# Patient Record
Sex: FEMALE | Race: WHITE | NOT HISPANIC OR LATINO | Employment: UNEMPLOYED | URBAN - METROPOLITAN AREA
[De-identification: names, ages, dates, MRNs, and addresses within clinical notes are randomized per-mention and may not be internally consistent; named-entity substitution may affect disease eponyms.]

---

## 2017-01-30 DIAGNOSIS — M19.171 POST-TRAUMATIC OSTEOARTHRITIS OF RIGHT ANKLE: ICD-10-CM

## 2017-01-30 DIAGNOSIS — M25.571 CHRONIC PAIN OF RIGHT ANKLE: Primary | ICD-10-CM

## 2017-01-30 DIAGNOSIS — G89.29 CHRONIC PAIN OF RIGHT ANKLE: Primary | ICD-10-CM

## 2017-01-30 NOTE — PROGRESS NOTES
Pt calling for repeat injection which she gets every three months: Fluoroscopically guided synvisc injection of the right ankle joint and subtalar joints per Dr. Nj.  Order entered as requested and pt has radiology scheduling number to set it up.

## 2017-04-27 ENCOUNTER — TELEPHONE (OUTPATIENT)
Dept: ORTHOPEDICS | Facility: CLINIC | Age: 57
End: 2017-04-27

## 2017-04-27 DIAGNOSIS — M19.171 POST-TRAUMATIC OSTEOARTHRITIS OF RIGHT ANKLE: ICD-10-CM

## 2017-04-27 DIAGNOSIS — M25.571 CHRONIC PAIN OF RIGHT ANKLE: Primary | ICD-10-CM

## 2017-04-27 DIAGNOSIS — G89.29 CHRONIC PAIN OF RIGHT ANKLE: Primary | ICD-10-CM

## 2017-04-27 NOTE — TELEPHONE ENCOUNTER
Patient calling for repeat injection which she gets every three months. Last injection 2/7/17, Fluroscopically guided Synvisc injection of the right ankle joint and subtalar joints was entered per Dr. Nj. Patient notified.

## 2017-04-27 NOTE — TELEPHONE ENCOUNTER
----- Message from Kacie Lynch sent at 4/27/2017  1:35 PM CDT -----  Regarding: Pt need order for injections - Dr. Nj  Contact: [phone]  Pt of Dr. Nj called and is looking for injection order. Pt said order needs to be put in before 05/19/2017 by the afternoon for her rt ankle. Pt can be reached at [phone].    Thank you,  Kacie ANTOHNY

## 2017-07-12 ENCOUNTER — TELEPHONE (OUTPATIENT)
Dept: ORTHOPEDICS | Facility: CLINIC | Age: 57
End: 2017-07-12

## 2017-07-12 DIAGNOSIS — G89.29 CHRONIC PAIN OF RIGHT ANKLE: Primary | ICD-10-CM

## 2017-07-12 DIAGNOSIS — M25.571 CHRONIC PAIN OF RIGHT ANKLE: Primary | ICD-10-CM

## 2017-07-12 DIAGNOSIS — M19.171 POST-TRAUMATIC ARTHRITIS OF RIGHT ANKLE: ICD-10-CM

## 2017-07-12 NOTE — TELEPHONE ENCOUNTER
Returned patients phone call in regards to her request for an order for Synvisc injections to be placed.  Received message that her last injection was done 5/19/17 and that she would like to get her injection 8/18.  Message left for her to call back.      7/12/17 12:30pm- Returned patients call back. She was notified that we will place the order for her injection so she can schedule it for 8/18 but that this writer will discuss with Dr. Nj if she needs to return to clinic prior to that appointment since it has been 3 years since she was seen in clinic.  Patient was told that she will get a return phone call next Tuesday with further instructions as to whether or not she needs to return for a clinic appointment.

## 2017-10-16 ENCOUNTER — TELEPHONE (OUTPATIENT)
Dept: ORTHOPEDICS | Facility: CLINIC | Age: 57
End: 2017-10-16

## 2017-10-16 DIAGNOSIS — M25.571 PAIN IN JOINT INVOLVING ANKLE AND FOOT, RIGHT: Primary | ICD-10-CM

## 2017-10-16 NOTE — TELEPHONE ENCOUNTER
Aby has been receiving Right ankle and subtalar Synvisc injections for WC injury every three months, last injection 8/18/17.  She left message with our call center requesting orders for her next injection with hold date 11/10/17.  Order was entered per Dr Nj's plan.

## 2018-01-09 ENCOUNTER — TELEPHONE (OUTPATIENT)
Dept: ORTHOPEDICS | Facility: CLINIC | Age: 58
End: 2018-01-09

## 2018-01-09 DIAGNOSIS — M19.071 ARTHRITIS OF RIGHT ANKLE: Primary | ICD-10-CM

## 2018-02-23 ENCOUNTER — RADIANT APPOINTMENT (OUTPATIENT)
Dept: GENERAL RADIOLOGY | Facility: CLINIC | Age: 58
End: 2018-02-23
Attending: ORTHOPAEDIC SURGERY
Payer: COMMERCIAL

## 2018-02-23 DIAGNOSIS — M19.071 ARTHRITIS OF RIGHT ANKLE: ICD-10-CM

## 2018-02-23 RX ORDER — LIDOCAINE HYDROCHLORIDE 10 MG/ML
30 INJECTION, SOLUTION EPIDURAL; INFILTRATION; INTRACAUDAL; PERINEURAL ONCE
Status: COMPLETED | OUTPATIENT
Start: 2018-02-23 | End: 2018-02-23

## 2018-02-23 RX ORDER — TRIAMCINOLONE ACETONIDE 40 MG/ML
40 INJECTION, SUSPENSION INTRA-ARTICULAR; INTRAMUSCULAR ONCE
Status: DISCONTINUED | OUTPATIENT
Start: 2018-02-23 | End: 2018-02-23 | Stop reason: CLARIF

## 2018-02-23 RX ORDER — IOPAMIDOL 408 MG/ML
20 INJECTION, SOLUTION INTRATHECAL ONCE
Status: COMPLETED | OUTPATIENT
Start: 2018-02-23 | End: 2018-02-23

## 2018-02-23 RX ORDER — BUPIVACAINE HYDROCHLORIDE 2.5 MG/ML
25 INJECTION, SOLUTION EPIDURAL; INFILTRATION; INTRACAUDAL ONCE
Status: ACTIVE | OUTPATIENT
Start: 2018-02-23

## 2018-02-23 RX ADMIN — LIDOCAINE HYDROCHLORIDE 4 ML: 10 INJECTION, SOLUTION EPIDURAL; INFILTRATION; INTRACAUDAL; PERINEURAL at 09:08

## 2018-02-23 RX ADMIN — IOPAMIDOL 2 ML: 408 INJECTION, SOLUTION INTRATHECAL at 09:08

## 2018-04-17 ENCOUNTER — TELEPHONE (OUTPATIENT)
Dept: ORTHOPEDICS | Facility: CLINIC | Age: 58
End: 2018-04-17

## 2018-04-17 DIAGNOSIS — M19.071 ARTHRITIS OF RIGHT ANKLE: Primary | ICD-10-CM

## 2018-04-17 NOTE — TELEPHONE ENCOUNTER
Patient called requesting a repeat synvisc injection to her right ankle and right subtalar joint, she gets therse injections every 3 months per Dr. Nj.  Date of last injection was 2/23/18. Orders entered for patient to call and schedule the injection for May 18th or after.

## 2018-05-18 ENCOUNTER — RADIANT APPOINTMENT (OUTPATIENT)
Dept: GENERAL RADIOLOGY | Facility: CLINIC | Age: 58
End: 2018-05-18
Attending: ORTHOPAEDIC SURGERY
Payer: OTHER MISCELLANEOUS

## 2018-05-18 DIAGNOSIS — M19.071 ARTHRITIS OF RIGHT ANKLE: ICD-10-CM

## 2018-05-18 RX ORDER — IOPAMIDOL 408 MG/ML
20 INJECTION, SOLUTION INTRATHECAL ONCE
Status: COMPLETED | OUTPATIENT
Start: 2018-05-18 | End: 2018-05-18

## 2018-05-18 RX ORDER — LIDOCAINE HYDROCHLORIDE 10 MG/ML
5 INJECTION, SOLUTION EPIDURAL; INFILTRATION; INTRACAUDAL; PERINEURAL ONCE
Status: COMPLETED | OUTPATIENT
Start: 2018-05-18 | End: 2018-05-18

## 2018-05-18 RX ADMIN — LIDOCAINE HYDROCHLORIDE 5 ML: 10 INJECTION, SOLUTION EPIDURAL; INFILTRATION; INTRACAUDAL; PERINEURAL at 09:01

## 2018-05-18 RX ADMIN — IOPAMIDOL 2 ML: 408 INJECTION, SOLUTION INTRATHECAL at 09:01

## 2018-08-02 ENCOUNTER — TELEPHONE (OUTPATIENT)
Dept: ORTHOPEDICS | Facility: CLINIC | Age: 58
End: 2018-08-02

## 2018-08-02 DIAGNOSIS — M19.071 ARTHRITIS OF RIGHT ANKLE: Primary | ICD-10-CM

## 2018-08-02 NOTE — TELEPHONE ENCOUNTER
[facility] Call Center    Phone Message    May a detailed message be left on voicemail: yes    Reason for Call: Other: Pt needs order for joint injection     Action Taken: Message routed to:  [facility]

## 2018-08-23 ENCOUNTER — RADIANT APPOINTMENT (OUTPATIENT)
Dept: GENERAL RADIOLOGY | Facility: CLINIC | Age: 58
End: 2018-08-23
Attending: ORTHOPAEDIC SURGERY
Payer: OTHER MISCELLANEOUS

## 2018-08-23 DIAGNOSIS — M19.071 ARTHRITIS OF RIGHT ANKLE: ICD-10-CM

## 2018-08-23 RX ORDER — LIDOCAINE HYDROCHLORIDE 10 MG/ML
30 INJECTION, SOLUTION EPIDURAL; INFILTRATION; INTRACAUDAL; PERINEURAL ONCE
Status: COMPLETED | OUTPATIENT
Start: 2018-08-23 | End: 2018-08-23

## 2018-08-23 RX ORDER — IOPAMIDOL 408 MG/ML
20 INJECTION, SOLUTION INTRATHECAL ONCE
Status: COMPLETED | OUTPATIENT
Start: 2018-08-23 | End: 2018-08-23

## 2018-08-23 RX ADMIN — LIDOCAINE HYDROCHLORIDE 5 ML: 10 INJECTION, SOLUTION EPIDURAL; INFILTRATION; INTRACAUDAL; PERINEURAL at 10:57

## 2018-08-23 RX ADMIN — IOPAMIDOL 4 ML: 408 INJECTION, SOLUTION INTRATHECAL at 10:57

## 2018-10-08 ENCOUNTER — TELEPHONE (OUTPATIENT)
Dept: ORTHOPEDICS | Facility: CLINIC | Age: 58
End: 2018-10-08

## 2018-10-08 NOTE — TELEPHONE ENCOUNTER
[facility] Call Center    Phone Message    May a detailed message be left on voicemail: yes    Reason for Call: Order(s): Other:   Reason for requested: Injection  Date needed: Mid- November, requested the 16th  Provider name: Dr. Clem Genao Pt would like call back from [facility] on this      Action Taken: Message routed to:  [facility]o

## 2018-10-09 DIAGNOSIS — M25.571 PAIN IN JOINT, ANKLE AND FOOT, RIGHT: Primary | ICD-10-CM

## 2018-10-09 NOTE — TELEPHONE ENCOUNTER
RN called and left a voice  Message that the orders where put in for the fluro guided right tibiotalar injection and the Right subtalar injection.  Please call and let us know where you would like this done.

## 2018-11-07 ENCOUNTER — RADIANT APPOINTMENT (OUTPATIENT)
Dept: GENERAL RADIOLOGY | Facility: CLINIC | Age: 58
End: 2018-11-07
Attending: ORTHOPAEDIC SURGERY
Payer: OTHER MISCELLANEOUS

## 2018-11-07 DIAGNOSIS — M25.571 PAIN IN JOINT, ANKLE AND FOOT, RIGHT: ICD-10-CM

## 2018-11-07 RX ORDER — TRIAMCINOLONE ACETONIDE 40 MG/ML
40 INJECTION, SUSPENSION INTRA-ARTICULAR; INTRAMUSCULAR ONCE
Status: DISCONTINUED | OUTPATIENT
Start: 2018-11-07 | End: 2018-11-07 | Stop reason: CLARIF

## 2018-11-07 RX ORDER — LIDOCAINE HYDROCHLORIDE 10 MG/ML
30 INJECTION, SOLUTION EPIDURAL; INFILTRATION; INTRACAUDAL; PERINEURAL ONCE
Status: COMPLETED | OUTPATIENT
Start: 2018-11-07 | End: 2018-11-07

## 2018-11-07 RX ORDER — BUPIVACAINE HYDROCHLORIDE 2.5 MG/ML
25 INJECTION, SOLUTION EPIDURAL; INFILTRATION; INTRACAUDAL ONCE
Status: DISCONTINUED | OUTPATIENT
Start: 2018-11-07 | End: 2018-11-07 | Stop reason: CLARIF

## 2018-11-07 RX ORDER — IOPAMIDOL 408 MG/ML
20 INJECTION, SOLUTION INTRATHECAL ONCE
Status: COMPLETED | OUTPATIENT
Start: 2018-11-07 | End: 2018-11-07

## 2018-11-07 RX ADMIN — IOPAMIDOL 2 ML: 408 INJECTION, SOLUTION INTRATHECAL at 14:13

## 2018-11-07 RX ADMIN — LIDOCAINE HYDROCHLORIDE 5 ML: 10 INJECTION, SOLUTION EPIDURAL; INFILTRATION; INTRACAUDAL; PERINEURAL at 14:13

## 2018-11-20 ENCOUNTER — TELEPHONE (OUTPATIENT)
Dept: ONCOLOGY | Facility: CLINIC | Age: 58
End: 2018-11-20

## 2018-11-20 NOTE — TELEPHONE ENCOUNTER
Tobacco Treatment Team at the [facility] attempted to reach Ms. Medina on 11/20/2018 regarding the tobacco cessation program to help Ms. Medina to quit smoking. We will attempt to reach Ms. Medina another time.

## 2018-12-05 NOTE — TELEPHONE ENCOUNTER
Tobacco Treatment Team at the [facility] attempted to reach Ms. Medina on 12/5/2018 regarding the tobacco cessation program to help Ms. Medina to quit smoking. We will attempt to reach Ms. Medina another time.

## 2019-01-07 ENCOUNTER — TELEPHONE (OUTPATIENT)
Dept: ORTHOPEDICS | Facility: CLINIC | Age: 59
End: 2019-01-07

## 2019-01-07 NOTE — TELEPHONE ENCOUNTER
[facility] Call Center    Phone Message    May a detailed message be left on voicemail: yes    Reason for Call: Order(s): Other:   Reason for requested: Pt requesting order for inj.   Date needed: Please call pt once order have been placed.   Provider name: Dr. Nj      Action Taken: Message routed to:  [facility] Orthopedics

## 2019-01-09 NOTE — TELEPHONE ENCOUNTER
[facility] Call Center    Phone Message    May a detailed message be left on voicemail: yes    Reason for Call: Other: Pt is waiting for orders for injection to be put in system, pt tried to call and schedule and was unable to because there are no orders, please put orders in and then call pt to let her know they are in so she can schedule     Action Taken: Message routed to:  [facility] Clinic

## 2019-01-10 ENCOUNTER — TELEPHONE (OUTPATIENT)
Dept: ORTHOPEDICS | Facility: CLINIC | Age: 59
End: 2019-01-10

## 2019-01-10 DIAGNOSIS — M25.571 PAIN IN JOINT, ANKLE AND FOOT, RIGHT: Primary | ICD-10-CM

## 2019-01-10 NOTE — TELEPHONE ENCOUNTER
[facility] Call Center    Phone Message    May a detailed message be left on voicemail: yes    Reason for Call: Other: Pt calling for third time for orders for injection. Please place orders so she can schedule     Action Taken: Message routed to:  [facility] Orthopedics

## 2019-01-10 NOTE — TELEPHONE ENCOUNTER
The patient called in to get a repeat order for ankle and foot synvisc injections for her right side. I called the patient back and left a message telling the patient that I have filled the orders but she cannot get the injection until 2/7/19 or later as she is not allowed to get injection and closer than 3 months apart and her last injection was 11/7/18. I explained that if the patient is going to get her injection within the [facility] system they should be able to see the orders. If she chooses to get the injection outside of [place] she will need to call and provide a fax number for the location she is planning to get the injections so that we can send the order.    Tiffanie Cantrell, ATC

## 2019-01-10 NOTE — TELEPHONE ENCOUNTER
Orders in, voice message left for patient.   See below:  Tiffanie Cantrell, JOHNY          1/10/19 2:17 PM   Note      The patient called in to get a repeat order for ankle and foot synvisc injections for her right side. I called the patient back and left a message telling the patient that I have filled the orders but she cannot get the injection until 2/7/19 or later as she is not allowed to get injection and closer than 3 months apart and her last injection was 11/7/18. I explained that if the patient is going to get her injection within the [facility] system they should be able to see the orders. If she chooses to get the injection outside of [place] she will need to call and provide a fax number for the location she is planning to get the injections so that we can send the order.     Tiffanie Cantrell, ATC

## 2019-02-07 ENCOUNTER — ANCILLARY PROCEDURE (OUTPATIENT)
Dept: GENERAL RADIOLOGY | Facility: CLINIC | Age: 59
End: 2019-02-07
Payer: OTHER MISCELLANEOUS

## 2019-02-07 DIAGNOSIS — M25.571 PAIN IN JOINT, ANKLE AND FOOT, RIGHT: ICD-10-CM

## 2019-02-07 RX ORDER — BUPIVACAINE HYDROCHLORIDE 2.5 MG/ML
10 INJECTION, SOLUTION EPIDURAL; INFILTRATION; INTRACAUDAL ONCE
Status: ACTIVE | OUTPATIENT
Start: 2019-02-07

## 2019-02-07 RX ORDER — IOPAMIDOL 408 MG/ML
20 INJECTION, SOLUTION INTRATHECAL ONCE
Status: COMPLETED | OUTPATIENT
Start: 2019-02-07 | End: 2019-02-07

## 2019-02-07 RX ORDER — LIDOCAINE HYDROCHLORIDE 10 MG/ML
30 INJECTION, SOLUTION EPIDURAL; INFILTRATION; INTRACAUDAL; PERINEURAL ONCE
Status: COMPLETED | OUTPATIENT
Start: 2019-02-07 | End: 2019-02-07

## 2019-02-07 RX ADMIN — LIDOCAINE HYDROCHLORIDE 5 ML: 10 INJECTION, SOLUTION EPIDURAL; INFILTRATION; INTRACAUDAL; PERINEURAL at 10:17

## 2019-02-07 RX ADMIN — IOPAMIDOL 10 ML: 408 INJECTION, SOLUTION INTRATHECAL at 10:17

## 2019-04-05 ENCOUNTER — TELEPHONE (OUTPATIENT)
Dept: ORTHOPEDICS | Facility: CLINIC | Age: 59
End: 2019-04-05

## 2019-04-05 DIAGNOSIS — M25.579 PAIN IN JOINT INVOLVING ANKLE AND FOOT: Primary | ICD-10-CM

## 2019-04-05 NOTE — TELEPHONE ENCOUNTER
Called patient and let her know that I would put the order in for her, and that the earliest she can schedule the injection for is 5/7/19. She understood and will schedule once I put the order in.

## 2019-04-05 NOTE — TELEPHONE ENCOUNTER
[facility] Call Center    Phone Message    May a detailed message be left on voicemail: yes    Reason for Call: Other: Pt of Dr. Nj asking for a call back today about getting another order faxed.  Pt requested to speak with Dr. Nj's care team     Action Taken: Message routed to:  [facility] Ortho

## 2019-05-02 NOTE — TELEPHONE ENCOUNTER
"Pt states not being ready to quit at this time. \"I enjoy my vices as I deal with my health right now\" but would consider it in the future.     Dejah Vides [facility]  Tobacco Treatment Specialist  PH: [phone]    "

## 2019-05-07 ENCOUNTER — ANCILLARY PROCEDURE (OUTPATIENT)
Dept: GENERAL RADIOLOGY | Facility: CLINIC | Age: 59
End: 2019-05-07
Attending: ORTHOPAEDIC SURGERY
Payer: OTHER MISCELLANEOUS

## 2019-05-07 DIAGNOSIS — M25.579 PAIN IN JOINT INVOLVING ANKLE AND FOOT: ICD-10-CM

## 2019-05-07 RX ORDER — IOPAMIDOL 408 MG/ML
20 INJECTION, SOLUTION INTRATHECAL ONCE
Status: COMPLETED | OUTPATIENT
Start: 2019-05-07 | End: 2019-05-07

## 2019-05-07 RX ORDER — LIDOCAINE HYDROCHLORIDE 10 MG/ML
30 INJECTION, SOLUTION EPIDURAL; INFILTRATION; INTRACAUDAL; PERINEURAL ONCE
Status: COMPLETED | OUTPATIENT
Start: 2019-05-07 | End: 2019-05-07

## 2019-05-07 RX ADMIN — IOPAMIDOL 4 ML: 408 INJECTION, SOLUTION INTRATHECAL at 10:17

## 2019-05-07 RX ADMIN — LIDOCAINE HYDROCHLORIDE 10 ML: 10 INJECTION, SOLUTION EPIDURAL; INFILTRATION; INTRACAUDAL; PERINEURAL at 10:17

## 2019-07-10 ENCOUNTER — TELEPHONE (OUTPATIENT)
Dept: ORTHOPEDICS | Facility: CLINIC | Age: 59
End: 2019-07-10

## 2019-07-10 DIAGNOSIS — M25.579 ANKLE PAIN: Primary | ICD-10-CM

## 2019-07-10 NOTE — TELEPHONE ENCOUNTER
[facility] Call Center    Phone Message    May a detailed message be left on voicemail: yes    Reason for Call: Order(s): Other:   Reason for requested: Pt states she calls in every 3 months to get an order for her ankle injection.   Date needed: Please call pt once order have been placed.   Provider name: Dr. Nj      Action Taken: Message routed to:  [facility]ics

## 2019-08-09 ENCOUNTER — ANCILLARY PROCEDURE (OUTPATIENT)
Dept: GENERAL RADIOLOGY | Facility: CLINIC | Age: 59
End: 2019-08-09
Attending: ORTHOPAEDIC SURGERY
Payer: OTHER MISCELLANEOUS

## 2019-08-09 DIAGNOSIS — M25.579 ANKLE PAIN: ICD-10-CM

## 2019-08-09 RX ORDER — LIDOCAINE HYDROCHLORIDE 10 MG/ML
30 INJECTION, SOLUTION EPIDURAL; INFILTRATION; INTRACAUDAL; PERINEURAL ONCE
Status: COMPLETED | OUTPATIENT
Start: 2019-08-09 | End: 2019-08-09

## 2019-08-09 RX ORDER — IOPAMIDOL 408 MG/ML
20 INJECTION, SOLUTION INTRATHECAL ONCE
Status: COMPLETED | OUTPATIENT
Start: 2019-08-09 | End: 2019-08-09

## 2019-08-09 RX ADMIN — LIDOCAINE HYDROCHLORIDE 5 ML: 10 INJECTION, SOLUTION EPIDURAL; INFILTRATION; INTRACAUDAL; PERINEURAL at 13:33

## 2019-08-09 RX ADMIN — IOPAMIDOL 2 ML: 408 INJECTION, SOLUTION INTRATHECAL at 13:33

## 2019-10-07 ENCOUNTER — TELEPHONE (OUTPATIENT)
Dept: ORTHOPEDICS | Facility: CLINIC | Age: 59
End: 2019-10-07

## 2019-10-07 DIAGNOSIS — M19.071 ARTHRITIS OF RIGHT ANKLE: Primary | ICD-10-CM

## 2019-10-07 NOTE — TELEPHONE ENCOUNTER
I called Aby back and let her know that the referral has been placed and she can get this done in November after 3 month arianne of her last injection.     Tiffanie Cantrell, ATC

## 2019-10-07 NOTE — TELEPHONE ENCOUNTER
[facility] Call Center    Phone Message    May a detailed message be left on voicemail: yes    Reason for Call: Order(s): Other:   Reason for requested: Injection orders  Date needed: November 2019  Provider name: Dr. Nj      Action Taken: Message routed to:  [facility] Ortho

## 2019-11-08 ENCOUNTER — ANCILLARY PROCEDURE (OUTPATIENT)
Dept: GENERAL RADIOLOGY | Facility: CLINIC | Age: 59
End: 2019-11-08
Attending: ORTHOPAEDIC SURGERY
Payer: OTHER MISCELLANEOUS

## 2019-11-08 DIAGNOSIS — M19.071 ARTHRITIS OF RIGHT ANKLE: ICD-10-CM

## 2019-11-08 RX ORDER — BUPIVACAINE HYDROCHLORIDE 2.5 MG/ML
10 INJECTION, SOLUTION EPIDURAL; INFILTRATION; INTRACAUDAL ONCE
Status: DISCONTINUED | OUTPATIENT
Start: 2019-11-08 | End: 2019-11-08 | Stop reason: CLARIF

## 2019-11-08 RX ORDER — IOPAMIDOL 408 MG/ML
20 INJECTION, SOLUTION INTRATHECAL ONCE
Status: COMPLETED | OUTPATIENT
Start: 2019-11-08 | End: 2019-11-08

## 2019-11-08 RX ORDER — LIDOCAINE HYDROCHLORIDE 10 MG/ML
30 INJECTION, SOLUTION EPIDURAL; INFILTRATION; INTRACAUDAL; PERINEURAL ONCE
Status: COMPLETED | OUTPATIENT
Start: 2019-11-08 | End: 2019-11-08

## 2019-11-08 RX ADMIN — LIDOCAINE HYDROCHLORIDE 5 ML: 10 INJECTION, SOLUTION EPIDURAL; INFILTRATION; INTRACAUDAL; PERINEURAL at 13:10

## 2019-11-08 RX ADMIN — IOPAMIDOL 10 ML: 408 INJECTION, SOLUTION INTRATHECAL at 13:10

## 2020-01-20 ENCOUNTER — TELEPHONE (OUTPATIENT)
Dept: ORTHOPEDICS | Facility: CLINIC | Age: 60
End: 2020-01-20

## 2020-01-20 NOTE — TELEPHONE ENCOUNTER
[facility] Call [facility]    Phone Message    May a detailed message be left on voicemail: yes    Reason for Call: Other: Pt would like an order put in for her 3 month injection for her ankle and would like a call back to discuss     Action Taken: Message routed to:  [facility]

## 2020-01-21 DIAGNOSIS — M19.071 ARTHRITIS OF RIGHT ANKLE: Primary | ICD-10-CM

## 2020-01-21 DIAGNOSIS — Z53.9 ERRONEOUS ENCOUNTER--DISREGARD: Primary | ICD-10-CM

## 2020-01-21 NOTE — TELEPHONE ENCOUNTER
[facility]    Phone Message    May a detailed message be left on voicemail: yes    Reason for Call: Pt called again about status orders for 3 month injection and requesting a call back to discuss.     Action Taken: Message routed to:  [facility]

## 2020-02-14 ENCOUNTER — ANCILLARY PROCEDURE (OUTPATIENT)
Dept: GENERAL RADIOLOGY | Facility: CLINIC | Age: 60
End: 2020-02-14
Attending: ORTHOPAEDIC SURGERY
Payer: OTHER MISCELLANEOUS

## 2020-02-14 DIAGNOSIS — M19.071 ARTHRITIS OF RIGHT ANKLE: ICD-10-CM

## 2020-02-14 RX ORDER — IOPAMIDOL 408 MG/ML
10 INJECTION, SOLUTION INTRATHECAL ONCE
Status: COMPLETED | OUTPATIENT
Start: 2020-02-14 | End: 2020-02-14

## 2020-02-14 RX ORDER — LIDOCAINE HYDROCHLORIDE 10 MG/ML
30 INJECTION, SOLUTION EPIDURAL; INFILTRATION; INTRACAUDAL; PERINEURAL ONCE
Status: COMPLETED | OUTPATIENT
Start: 2020-02-14 | End: 2020-02-14

## 2020-02-14 RX ADMIN — IOPAMIDOL 5 ML: 408 INJECTION, SOLUTION INTRATHECAL at 13:35

## 2020-02-14 RX ADMIN — LIDOCAINE HYDROCHLORIDE 5 ML: 10 INJECTION, SOLUTION EPIDURAL; INFILTRATION; INTRACAUDAL; PERINEURAL at 13:36

## 2020-04-13 ENCOUNTER — TELEPHONE (OUTPATIENT)
Dept: ORTHOPEDICS | Facility: CLINIC | Age: 60
End: 2020-04-13

## 2020-04-13 DIAGNOSIS — M19.071 ARTHRITIS OF RIGHT ANKLE: Primary | ICD-10-CM

## 2020-04-13 NOTE — TELEPHONE ENCOUNTER
[facility] Call [facility]    Phone Message    May a detailed message be left on voicemail: yes     Reason for Call: Other: Patient was calling to get orders for an injection - hopefully to get it on 5/15/20.  Please follow up with patient.  Thank you!     Action Taken: Message Routed to: [facility]    Travel Screening: Not Applicable

## 2020-05-01 ENCOUNTER — TELEPHONE (OUTPATIENT)
Dept: ORTHOPEDICS | Facility: CLINIC | Age: 60
End: 2020-05-01

## 2020-05-01 NOTE — TELEPHONE ENCOUNTER
Pt was called back by RN.  Pt was told that injections done in IR for arthritis are elective, not urgent, and will have to wait until the risks of coronavirus have settled down and it's safe to proceed.  Pt verbalized understanding.  Pt was encouraged, per radiology scheduler, to call at the end of the month to see if appointments for elective injections would be able to be scheduled at that time.  Ofelia Castro RN

## 2020-05-01 NOTE — TELEPHONE ENCOUNTER
[facility] Call [facility]    Phone Message    May a detailed message be left on voicemail: yes     Reason for Call: Other: Pt requesting call back to discuss her injection that was schedule for 5/7 that was cancelled. Pt stated that she can not wait until July to have it done     Action Taken: Message routed to:  [facility]    Travel Screening: Not Applicable

## 2020-05-29 ENCOUNTER — TELEPHONE (OUTPATIENT)
Dept: ORTHOPEDICS | Facility: CLINIC | Age: 60
End: 2020-05-29

## 2020-05-29 NOTE — TELEPHONE ENCOUNTER
[facility] Call [facility]    Phone Message    May a detailed message be left on voicemail: yes     Reason for Call: Other: pt calling because he would like to talk to Ofelia. Please call the pt back to discuss. Thank You.      Action Taken: Message routed to:  [facility] Orthopedics    Travel Screening: Not Applicable

## 2020-05-29 NOTE — TELEPHONE ENCOUNTER
Pt was called back to address injection.  Pt was told that injections done in IR for arthritis are elective, not urgent, and will have to wait until the risks of coronavirus have settled down and it's safe to proceed. Pt was encouraged, per radiology scheduler, to call at the end of the month to see if appointments for elective injections would be able to be scheduled at that time. LVM with above information.

## 2020-05-29 NOTE — TELEPHONE ENCOUNTER
Writer called and left pt a message stating that the clinic here at the [facility] is not doing any IR injections until July. If pt has further questions they are to call back [phone].    Cheri Sierra LPN

## 2020-05-29 NOTE — TELEPHONE ENCOUNTER
[facility] Call [facility]    Phone Message    May a detailed message be left on voicemail: no     Reason for Call: Other: The patient wants an order from Dr. Nj request for injection that's necessary not elative, she wants the injection to be performed by either of the 3 provider she's got the injections in the past, please call patient asap to address question or concerns.     Action Taken: Message routed to:  [facility]o    Travel Screening: Not Applicable

## 2020-06-09 ENCOUNTER — TELEPHONE (OUTPATIENT)
Dept: ORTHOPEDICS | Facility: CLINIC | Age: 60
End: 2020-06-09

## 2020-06-16 ENCOUNTER — TELEPHONE (OUTPATIENT)
Dept: ORTHOPEDICS | Facility: CLINIC | Age: 60
End: 2020-06-16

## 2020-06-16 NOTE — TELEPHONE ENCOUNTER
Left a VM regarding her injection. Informed her that we are not doing IR injection until July. Left radiology number to pt as well.        JOHNY Leos

## 2020-06-16 NOTE — TELEPHONE ENCOUNTER
[facility] Call [facility]    Phone Message    May a detailed message be left on voicemail: yes     Reason for Call: Other: Pt would like a call back to discuss her injection order. PLease reach out to pt to discuss     Action Taken: Message routed to:  [facility] Ortho    Travel Screening: Not Applicable

## 2020-06-22 ENCOUNTER — TELEPHONE (OUTPATIENT)
Dept: ORTHOPEDICS | Facility: CLINIC | Age: 60
End: 2020-06-22

## 2020-06-22 DIAGNOSIS — Z11.59 ENCOUNTER FOR SCREENING FOR OTHER VIRAL DISEASES: Primary | ICD-10-CM

## 2020-06-22 NOTE — TELEPHONE ENCOUNTER
Aby was called back, she has been scheduled for her ankle injection.  Pt was made aware that there may be a covid screen needed prior to her injection.  They will call if necessary.  Ofelia Castro RN

## 2020-06-22 NOTE — TELEPHONE ENCOUNTER
[facility] Call Center    Phone Message    May a detailed message be left on voicemail: yes     Reason for Call: Other:   Pt calling back about the injection. Writer relayed message in chart from 06/16. Pt states that she can't be calling every wk and has been doing so the past 6 wks. Pt asked if she can be placed on the schedule for July. Writer encourage pt to call radiology but pt states that she did and would prefer for care team to call pt back to assist. Please advise.     Action Taken: Other:  ortho    Travel Screening: Not Applicable

## 2020-07-06 ENCOUNTER — TELEPHONE (OUTPATIENT)
Dept: GENERAL RADIOLOGY | Facility: CLINIC | Age: 60
End: 2020-07-06

## 2020-07-11 DIAGNOSIS — Z11.59 ENCOUNTER FOR SCREENING FOR OTHER VIRAL DISEASES: ICD-10-CM

## 2020-07-11 PROCEDURE — 99207 ZZC NO CHARGE LOS: CPT

## 2020-07-12 LAB
SARS-COV-2 PCR COMMENT: NORMAL
SARS-COV-2 RNA SPEC QL NAA+PROBE: NEGATIVE
SARS-COV-2 RNA SPEC QL NAA+PROBE: NORMAL
SPECIMEN SOURCE: NORMAL
SPECIMEN SOURCE: NORMAL

## 2020-07-14 ENCOUNTER — ANCILLARY PROCEDURE (OUTPATIENT)
Dept: GENERAL RADIOLOGY | Facility: CLINIC | Age: 60
End: 2020-07-14
Attending: ORTHOPAEDIC SURGERY
Payer: COMMERCIAL

## 2020-07-14 DIAGNOSIS — M19.071 ARTHRITIS OF RIGHT ANKLE: Primary | ICD-10-CM

## 2020-07-14 DIAGNOSIS — M19.071 ARTHRITIS OF RIGHT ANKLE: ICD-10-CM

## 2020-07-14 RX ORDER — LIDOCAINE HYDROCHLORIDE 10 MG/ML
30 INJECTION, SOLUTION EPIDURAL; INFILTRATION; INTRACAUDAL; PERINEURAL ONCE
Status: COMPLETED | OUTPATIENT
Start: 2020-07-14 | End: 2020-07-14

## 2020-07-14 RX ORDER — IOPAMIDOL 408 MG/ML
10 INJECTION, SOLUTION INTRATHECAL ONCE
Status: COMPLETED | OUTPATIENT
Start: 2020-07-14 | End: 2020-07-14

## 2020-07-14 RX ADMIN — IOPAMIDOL 2 ML: 408 INJECTION, SOLUTION INTRATHECAL at 11:49

## 2020-07-14 RX ADMIN — LIDOCAINE HYDROCHLORIDE 5 ML: 10 INJECTION, SOLUTION EPIDURAL; INFILTRATION; INTRACAUDAL; PERINEURAL at 11:51

## 2020-09-29 DIAGNOSIS — Z11.59 ENCOUNTER FOR SCREENING FOR OTHER VIRAL DISEASES: Primary | ICD-10-CM

## 2020-10-23 DIAGNOSIS — Z11.59 ENCOUNTER FOR SCREENING FOR OTHER VIRAL DISEASES: ICD-10-CM

## 2020-10-23 PROCEDURE — U0003 INFECTIOUS AGENT DETECTION BY NUCLEIC ACID (DNA OR RNA); SEVERE ACUTE RESPIRATORY SYNDROME CORONAVIRUS 2 (SARS-COV-2) (CORONAVIRUS DISEASE [COVID-19]), AMPLIFIED PROBE TECHNIQUE, MAKING USE OF HIGH THROUGHPUT TECHNOLOGIES AS DESCRIBED BY CMS-2020-01-R: HCPCS | Performed by: ORTHOPAEDIC SURGERY

## 2020-10-24 LAB
SARS-COV-2 RNA SPEC QL NAA+PROBE: NOT DETECTED
SPECIMEN SOURCE: NORMAL

## 2020-10-27 ENCOUNTER — ANCILLARY PROCEDURE (OUTPATIENT)
Dept: GENERAL RADIOLOGY | Facility: CLINIC | Age: 60
End: 2020-10-27
Attending: ORTHOPAEDIC SURGERY
Payer: OTHER MISCELLANEOUS

## 2020-10-27 PROCEDURE — 77002 NEEDLE LOCALIZATION BY XRAY: CPT | Performed by: RADIOLOGY

## 2020-10-27 PROCEDURE — 20605 DRAIN/INJ JOINT/BURSA W/O US: CPT | Mod: RT | Performed by: RADIOLOGY

## 2020-10-27 RX ORDER — IOPAMIDOL 408 MG/ML
20 INJECTION, SOLUTION INTRATHECAL ONCE
Status: COMPLETED | OUTPATIENT
Start: 2020-10-27 | End: 2020-10-27

## 2020-10-27 RX ORDER — LIDOCAINE HYDROCHLORIDE 10 MG/ML
30 INJECTION, SOLUTION EPIDURAL; INFILTRATION; INTRACAUDAL; PERINEURAL ONCE
Status: COMPLETED | OUTPATIENT
Start: 2020-10-27 | End: 2020-10-27

## 2020-10-27 RX ORDER — BUPIVACAINE HYDROCHLORIDE 2.5 MG/ML
10 INJECTION, SOLUTION EPIDURAL; INFILTRATION; INTRACAUDAL ONCE
Status: DISCONTINUED | OUTPATIENT
Start: 2020-10-27 | End: 2020-10-27 | Stop reason: CLARIF

## 2020-10-27 RX ADMIN — IOPAMIDOL 4 ML: 408 INJECTION, SOLUTION INTRATHECAL at 10:04

## 2020-10-27 RX ADMIN — LIDOCAINE HYDROCHLORIDE 10 ML: 10 INJECTION, SOLUTION EPIDURAL; INFILTRATION; INTRACAUDAL; PERINEURAL at 10:04

## 2020-12-28 ENCOUNTER — TELEPHONE (OUTPATIENT)
Dept: ORTHOPEDICS | Facility: CLINIC | Age: 60
End: 2020-12-28

## 2020-12-28 DIAGNOSIS — Z11.59 ENCOUNTER FOR SCREENING FOR OTHER VIRAL DISEASES: Primary | ICD-10-CM

## 2020-12-28 DIAGNOSIS — M19.071 ARTHRITIS OF RIGHT ANKLE: Primary | ICD-10-CM

## 2020-12-28 NOTE — TELEPHONE ENCOUNTER
[facility] Call [facility]    Phone Message    May a detailed message be left on voicemail: yes     Reason for Call: Other: Patient is requesting orders for injection for (R) ankle.      Action Taken: Message routed to:  [facility]o    Travel Screening: Not Applicable

## 2021-01-25 ENCOUNTER — OFFICE VISIT (OUTPATIENT)
Dept: LAB | Facility: CLINIC | Age: 61
End: 2021-01-25
Payer: OTHER MISCELLANEOUS

## 2021-01-25 DIAGNOSIS — Z11.59 ENCOUNTER FOR SCREENING FOR OTHER VIRAL DISEASES: ICD-10-CM

## 2021-01-25 LAB
SARS-COV-2 RNA RESP QL NAA+PROBE: NORMAL
SPECIMEN SOURCE: NORMAL

## 2021-01-25 PROCEDURE — 87635 SARS-COV-2 COVID-19 AMP PRB: CPT | Performed by: ORTHOPAEDIC SURGERY

## 2021-01-26 LAB
LABORATORY COMMENT REPORT: NORMAL
SARS-COV-2 RNA RESP QL NAA+PROBE: NEGATIVE
SPECIMEN SOURCE: NORMAL

## 2021-01-28 ENCOUNTER — ANCILLARY PROCEDURE (OUTPATIENT)
Dept: GENERAL RADIOLOGY | Facility: CLINIC | Age: 61
End: 2021-01-28
Attending: ORTHOPAEDIC SURGERY
Payer: OTHER MISCELLANEOUS

## 2021-01-28 DIAGNOSIS — M19.071 ARTHRITIS OF RIGHT ANKLE: ICD-10-CM

## 2021-01-28 PROCEDURE — 77002 NEEDLE LOCALIZATION BY XRAY: CPT | Mod: GC | Performed by: RADIOLOGY

## 2021-01-28 PROCEDURE — 20605 DRAIN/INJ JOINT/BURSA W/O US: CPT | Mod: RT | Performed by: RADIOLOGY

## 2021-01-28 RX ORDER — IOPAMIDOL 408 MG/ML
10 INJECTION, SOLUTION INTRATHECAL ONCE
Status: COMPLETED | OUTPATIENT
Start: 2021-01-28 | End: 2021-01-28

## 2021-01-28 RX ORDER — LIDOCAINE HYDROCHLORIDE 10 MG/ML
30 INJECTION, SOLUTION EPIDURAL; INFILTRATION; INTRACAUDAL; PERINEURAL ONCE
Status: COMPLETED | OUTPATIENT
Start: 2021-01-28 | End: 2021-01-28

## 2021-01-28 RX ADMIN — LIDOCAINE HYDROCHLORIDE 5 ML: 10 INJECTION, SOLUTION EPIDURAL; INFILTRATION; INTRACAUDAL; PERINEURAL at 10:13

## 2021-01-28 RX ADMIN — IOPAMIDOL 2 ML: 408 INJECTION, SOLUTION INTRATHECAL at 10:13

## 2021-02-18 NOTE — TELEPHONE ENCOUNTER
60 year old female presents for follow up on her hypertension.  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  Mild underlying stage 2 chronic kidney disease as well.  Her weight is her biggest concern today.  She has form she would like me to complete for weight watchers.  She notes some up and down in regards to depression and her mental health but overall feels like this is the happiest that she has been in quite a while.  She notes no significant issues with her anxiety.  Occasional crying episodes.  No suicidal ideation.  She has some fatigue associated with her medications but notes overall this to huge improvement in how she feels like she is doing.  Facet injections have helped.  She sees pain management.    REVIEW OF SYSTEMS:  Patient denies any headaches, SOB (shortness of breath), chest pain, or peripheral edema.  Past medical history including allergies, medications, adult illnesses, previous labs reviewed.    EXAM:  Blood pressure 130/72, pulse 68, temperature 95.4 °F (35.2 °C), temperature source Temporal, weight 88.4 kg, last menstrual period 12/09/2014, SpO2 98 %. Weight rising Rechecked blood pressure was 130/72 right arm large cuff.        Well developed, well nourished, non-ill appearing, no acute distress.        HEART:  regular rate and rhythm and no murmurs, clicks, or gallops        LUNGS:  clear to auscultation        EXTREMITIES:  no clubbing, cyanosis, edema or deformity noted    ASSESSMENT:    1. Essential hypertension    2. Moderate episode of recurrent major depressive disorder (CMS/[facility])    3. CKD (chronic kidney disease) stage 2, GFR 60-89 ml/min          PLAN: 1)  continue current medications     Overall doing very well emotionally at this time.  Continue her current medications.  Continue activity as tolerated.  I will complete the forms as requested for weight watchers.  Labs up-to-date          Follow up in 6 months for an annual exam,  Dr Nj has authorized Right ankle and subtalar Synvisc injections every three months for work comp injury.  Pt's last injection was on 10/27/2020, her next injection can be after 1/27/2021.  Order was entered with this start date.  Pt was called and voicemail left with radiology scheduling number.  Ofelia Castro RN   recheck of hypertension and depression, yearly labs.        Encouraged to continue to watch salt and ETOH (alcohol) intake and to         exercise at least 3 times per week.        See orders.

## 2021-03-18 ENCOUNTER — TELEPHONE (OUTPATIENT)
Dept: ORTHOPEDICS | Facility: CLINIC | Age: 61
End: 2021-03-18

## 2021-03-18 DIAGNOSIS — M19.071 ARTHRITIS OF RIGHT ANKLE: Primary | ICD-10-CM

## 2021-03-18 NOTE — TELEPHONE ENCOUNTER
To:  Ofelia RN (pt request)    [facility] Call Center    Phone Message:  Pt would like a call back to schedule her SYNVISC INJECTION for her RIGHT ankle.    May a detailed message be left on voicemail: Yes     Reason for Call: Other: Synvisc Injection: Schedule     Action Taken: Message routed to:  [facility]AM    Travel Screening: Not Applicable

## 2021-03-18 NOTE — TELEPHONE ENCOUNTER
Called patient and left voicemail. Informed patient that the order was placed for the synvisc injection and that I would reach out to her once this is approved.

## 2021-03-22 ENCOUNTER — TELEPHONE (OUTPATIENT)
Dept: ORTHOPEDICS | Facility: CLINIC | Age: 61
End: 2021-03-22

## 2021-03-22 NOTE — TELEPHONE ENCOUNTER
[facility] Call [facility]    Phone Message    May a detailed message be left on voicemail: yes     Reason for Call: Other: Pt would like another R ankle injection order to be done end of april.     Action Taken: Message routed to:  [facility]o    Travel Screening: Not Applicable

## 2021-03-25 ENCOUNTER — TELEPHONE (OUTPATIENT)
Dept: ORTHOPEDICS | Facility: CLINIC | Age: 61
End: 2021-03-25

## 2021-03-25 NOTE — TELEPHONE ENCOUNTER
I spoke with Mary Anne to be sure that everyone was on board that this is a work comp deal. We have sent for the approval and are waiting their response. She understands this but is upset because she thought that she was given a years worth of injections approved the last time she went through this. She explains that her problem isn't going to go away and every three months she needs the injection. She would like for approval for multiple injections so she doesn't have to wait for approval. I told her that unfortunately, we can't do this and will have to ask for approval each time. I did tell her that we usually here back on approval within a few day to a week with a week being the longest, so if she calls a week in advanced to get approval we should not hold her up getting the injection.    We will give her a call once we hear back on approval for the injection.    Tiffanie Cantrell, ATC

## 2021-03-25 NOTE — TELEPHONE ENCOUNTER
I called the patient to let her know that we are working on getting the approval for the injection. She states to be sure that the prior auth goes to through [facility] [employer]. Not her [employer]ica.     Tiffanie Cantrell, ATC

## 2021-03-25 NOTE — TELEPHONE ENCOUNTER
[facility] Call Center    Phone Message    May a detailed message be left on voicemail: yes     Reason for Call: Other: This pt is following up with her request for an injection order. Please have someone on Dr. Nj's care team reach out to this pt when this order has been placed so the pt can go forward with scheduling.     Action Taken: Message routed to:  [facility]    Travel Screening: Not Applicable

## 2021-03-25 NOTE — TELEPHONE ENCOUNTER
[facility] Call [facility]    Phone Message:  Pt called, stating her SYNVISC injection is for WC, which is through [employer].  Pt stated [employer] does not require a pre authorization for a SYNVISC injection.  Pt stated [employer] should not be involved in her SYNVISC injection, or requesting a pre authorization, as her SYNVISC injection is attached to her WC claim. Pt would like a call back in regards to her questions and any related follow up.    May a detailed message be left on voicemail: Yes     Reason for Call: Other: SYNVISC Injection:  Clarification of WC claim, and go through pt's questions regarding pre authorization     Action Taken: Message routed to:  [facility]    Travel Screening: Not Applicable

## 2021-03-25 NOTE — TELEPHONE ENCOUNTER
[facility] Call [facility]    Phone Message    May a detailed message be left on voicemail: yes     Reason for Call: Other: This pt of Dr. Nj's is calling back to reiterate that prior auth for the injection should be sent to WC only + not to [employer] and not a combination of both of those.     Action Taken: Message routed to:  [facility]    Travel Screening: Not Applicable

## 2021-03-26 ENCOUNTER — TELEPHONE (OUTPATIENT)
Dept: ORTHOPEDICS | Facility: CLINIC | Age: 61
End: 2021-03-26

## 2021-03-26 NOTE — TELEPHONE ENCOUNTER
Called patient and informed her that we are still waiting on a response and that It was sent to her work comp provider but we are waiting for prior auth to get back to us with approval.     Patient is frustrated that it is taking so long but I told her that as soon as it is approved, we will reach out to her.    Patient verbalized understanding.

## 2021-03-26 NOTE — TELEPHONE ENCOUNTER
[facility] Call [facility]    Phone Message    May a detailed message be left on voicemail: yes     Reason for Call: Other: Questions about order getting approved. It should be sent to [facility]     Action Taken: Message routed to:  [facility]o    Travel Screening: Not Applicable

## 2021-03-29 ENCOUNTER — TELEPHONE (OUTPATIENT)
Dept: ORTHOPEDICS | Facility: CLINIC | Age: 61
End: 2021-03-29

## 2021-03-29 DIAGNOSIS — M19.071 ARTHRITIS OF RIGHT ANKLE: Primary | ICD-10-CM

## 2021-03-29 NOTE — TELEPHONE ENCOUNTER
[facility] Call Center    Phone Message:  Pt called, stating she is now approved for the injection that she has been working on approval for.  Pt would like a call back to schedule the injection, unspecified type.     Pt stated she has been communicating with Ofelia, in regards to type of injection.    May a detailed message be left on voicemail: Yes     Reason for Call: Other: Approved For Injection:  Schedule Injection      Action Taken: Message routed to:  [facility] Team    Travel Screening: Not Applicable

## 2021-03-29 NOTE — TELEPHONE ENCOUNTER
"[facility] Call Center    Phone Message    May a detailed message be left on voicemail: yes     Reason for Call: Other: caall back     Action Taken: Message routed to:  [facility]o    Travel Screening: Not Applicable     Imaging says the order for injection is under the \"REFERRALS\" and cannot be used due to that - order needs to be replaced under Orders tab                                                                        a  "

## 2021-03-29 NOTE — TELEPHONE ENCOUNTER
I called patient and told her that she needs to call [facility] scheduling to schedule. Based on her call in she stated that she was approved. She then asked me if she has been approved I told her that we have not heard back from prior auth team that her work comp has said she was approved. She is confused. She says that DJ has approved her. I said if that is the case then she can schedule. I transferred her to imaging scheduling. I can see that the order was placed on 3/19/21 so she should be all set.      Tiffanie Cantrell, ATC

## 2021-03-31 ENCOUNTER — TELEPHONE (OUTPATIENT)
Dept: ORTHOPEDICS | Facility: CLINIC | Age: 61
End: 2021-03-31

## 2021-03-31 NOTE — TELEPHONE ENCOUNTER
----- Message from Mary Anne Cody RN sent at 3/31/2021  9:25 AM CDT -----  Regarding: FW: Synvisc injection approval    ----- Message -----  From: Vicky Batista  Sent: 3/31/2021   8:03 AM CDT  To: Mary Anne Cody RN  Subject: FW: Synvisc injection approval                   Good morning Mary Anne,    The PA was approved under the patient's work comp so this patient is good to go.    Thank you,    Vicky  ----- Message -----  From: Vicky Batista  Sent: 3/24/2021  10:28 AM CDT  To: Mary Anne Cody RN  Subject: RE: Synvisc injection approval                   Hi Mary Anne,    I submitted an off label PA & will let you know as soon as I hear back.    Thank you,    Vicky  ----- Message -----  From: Mary Anne Cody RN  Sent: 3/22/2021   2:05 PM CDT  To: Vicky Batista  Subject: RE: Synvisc injection approval                   Yes please.    Mary Anne  ----- Message -----  From: Vicky Batista  Sent: 3/22/2021  11:27 AM CDT  To: Sol Reeves LPN, Mary Anne Cody RN  Subject: RE: Synvisc injection approval                   Hi Mary Anne,    That is my understanding, would you like me to try to submit an off label PA?    Thank you,    Vicky  ----- Message -----  From: Mary Anne Cody RN  Sent: 3/22/2021  10:56 AM CDT  To: Sol Reeves LPN, Krystle Doeden  Subject: RE: Synvisc injection approval                   So it will only cover Synvisc or Eufflexa in the knee, correct? It will not cover this type of injection in the ankle?    Mary Anne  ----- Message -----  From: Vicky Batista  Sent: 3/19/2021   8:05 AM CDT  To: Sol Reeves LPN, Mary Anne Cody, RHEA  Subject: RE: Synvisc injection approval                   Good morning,    The patient's DX does not meet [employer]'s policy, I listed a link to the policy below to see if there are any alternative DX's:    [email or web address]    Let me know your thoughts,    Vicky  ----- Message -----  From: Mary Anne Cody RN  Sent:  3/18/2021   2:43 PM CDT  To: Sol Reeves LPN, Cam   Subject: Synvisc injection approval                       Please check for authorization for Synvisc for this patient; order in.    Thank you!    Mary Anne DELANEY RN Care Coordinator

## 2021-04-12 DIAGNOSIS — Z11.59 ENCOUNTER FOR SCREENING FOR OTHER VIRAL DISEASES: ICD-10-CM

## 2021-04-23 DIAGNOSIS — Z11.59 ENCOUNTER FOR SCREENING FOR OTHER VIRAL DISEASES: ICD-10-CM

## 2021-04-23 LAB
SARS-COV-2 RNA RESP QL NAA+PROBE: NORMAL
SPECIMEN SOURCE: NORMAL

## 2021-04-23 PROCEDURE — U0003 INFECTIOUS AGENT DETECTION BY NUCLEIC ACID (DNA OR RNA); SEVERE ACUTE RESPIRATORY SYNDROME CORONAVIRUS 2 (SARS-COV-2) (CORONAVIRUS DISEASE [COVID-19]), AMPLIFIED PROBE TECHNIQUE, MAKING USE OF HIGH THROUGHPUT TECHNOLOGIES AS DESCRIBED BY CMS-2020-01-R: HCPCS | Performed by: ORTHOPAEDIC SURGERY

## 2021-04-23 PROCEDURE — U0005 INFEC AGEN DETEC AMPLI PROBE: HCPCS | Performed by: ORTHOPAEDIC SURGERY

## 2021-04-27 ENCOUNTER — ANCILLARY PROCEDURE (OUTPATIENT)
Dept: GENERAL RADIOLOGY | Facility: CLINIC | Age: 61
End: 2021-04-27
Attending: ORTHOPAEDIC SURGERY
Payer: OTHER MISCELLANEOUS

## 2021-04-27 DIAGNOSIS — M19.071 ARTHRITIS OF RIGHT ANKLE: ICD-10-CM

## 2021-04-27 PROCEDURE — 77002 NEEDLE LOCALIZATION BY XRAY: CPT | Performed by: STUDENT IN AN ORGANIZED HEALTH CARE EDUCATION/TRAINING PROGRAM

## 2021-04-27 PROCEDURE — 20605 DRAIN/INJ JOINT/BURSA W/O US: CPT | Mod: RT | Performed by: STUDENT IN AN ORGANIZED HEALTH CARE EDUCATION/TRAINING PROGRAM

## 2021-04-27 PROCEDURE — 99207 PR SATISFY VISIT NUMBER: CPT | Performed by: RADIOLOGY

## 2021-04-27 RX ORDER — LIDOCAINE HYDROCHLORIDE 10 MG/ML
30 INJECTION, SOLUTION EPIDURAL; INFILTRATION; INTRACAUDAL; PERINEURAL ONCE
Status: COMPLETED | OUTPATIENT
Start: 2021-04-27 | End: 2021-04-27

## 2021-04-27 RX ORDER — IOPAMIDOL 408 MG/ML
10 INJECTION, SOLUTION INTRATHECAL ONCE
Status: COMPLETED | OUTPATIENT
Start: 2021-04-27 | End: 2021-04-27

## 2021-04-27 RX ADMIN — LIDOCAINE HYDROCHLORIDE 5 ML: 10 INJECTION, SOLUTION EPIDURAL; INFILTRATION; INTRACAUDAL; PERINEURAL at 10:06

## 2021-04-27 RX ADMIN — IOPAMIDOL 2 ML: 408 INJECTION, SOLUTION INTRATHECAL at 10:06

## 2021-06-07 ENCOUNTER — TELEPHONE (OUTPATIENT)
Dept: ORTHOPEDICS | Facility: CLINIC | Age: 61
End: 2021-06-07

## 2021-06-07 DIAGNOSIS — M19.071 ARTHRITIS OF RIGHT ANKLE: Primary | ICD-10-CM

## 2021-06-07 NOTE — TELEPHONE ENCOUNTER
[facility] Call Center    Phone Message    May a detailed message be left on voicemail: yes     Reason for Call: Order(s): Other:   Reason for requested: Ankle Injection  Date needed: 6/7/21  Provider name:     Looks like the first initial message was incorrect. Patient was looking for an ankle injection. Patient does NOT have any knee problems. Patient was just being proactive, to get the order for the ankle injection placed by  (per pt, he orders it every 3 months) so that it can get approved and she can schedule ahead of time.       Action Taken: Message routed to:  [facility]    Travel Screening: Not Applicable

## 2021-06-07 NOTE — TELEPHONE ENCOUNTER
I called Aby back and let her know that Dr. Nj does her orders for the ankle injections not the knee. Looks like she received the ankle injection at the end of April. She will need to reach out to who ever she sees for her knee to order the injection.    Tiffanie Cantrell, ATC

## 2021-06-07 NOTE — TELEPHONE ENCOUNTER
I placed an updated ankle injections She cannot complete these until 7/27/21. She is aware of this just wants to be able to schedule and have insurance approval for the injections.     Tiffanie Cantrell, ATC

## 2021-06-07 NOTE — TELEPHONE ENCOUNTER
[facility]    Phone Message    May a detailed message be left on voicemail: yes     Reason for Call: Order(s): Other:   Reason for requested: knee injection  Date needed: today  Provider name: Dr. Nj    Action Taken: Message routed to:  [facility]    Travel Screening: Not Applicable

## 2021-06-11 DIAGNOSIS — Z11.59 ENCOUNTER FOR SCREENING FOR OTHER VIRAL DISEASES: ICD-10-CM

## 2021-07-26 ENCOUNTER — LAB (OUTPATIENT)
Dept: LAB | Facility: CLINIC | Age: 61
End: 2021-07-26
Payer: OTHER MISCELLANEOUS

## 2021-07-26 DIAGNOSIS — Z11.59 ENCOUNTER FOR SCREENING FOR OTHER VIRAL DISEASES: ICD-10-CM

## 2021-07-26 LAB — SARS-COV-2 RNA RESP QL NAA+PROBE: NEGATIVE

## 2021-07-26 PROCEDURE — U0005 INFEC AGEN DETEC AMPLI PROBE: HCPCS

## 2021-07-26 PROCEDURE — U0003 INFECTIOUS AGENT DETECTION BY NUCLEIC ACID (DNA OR RNA); SEVERE ACUTE RESPIRATORY SYNDROME CORONAVIRUS 2 (SARS-COV-2) (CORONAVIRUS DISEASE [COVID-19]), AMPLIFIED PROBE TECHNIQUE, MAKING USE OF HIGH THROUGHPUT TECHNOLOGIES AS DESCRIBED BY CMS-2020-01-R: HCPCS

## 2021-07-27 DIAGNOSIS — Z11.59 ENCOUNTER FOR SCREENING FOR OTHER VIRAL DISEASES: ICD-10-CM

## 2021-07-31 ENCOUNTER — LAB (OUTPATIENT)
Dept: LAB | Facility: CLINIC | Age: 61
End: 2021-07-31
Attending: ORTHOPAEDIC SURGERY
Payer: OTHER MISCELLANEOUS

## 2021-07-31 DIAGNOSIS — Z11.59 ENCOUNTER FOR SCREENING FOR OTHER VIRAL DISEASES: ICD-10-CM

## 2021-07-31 PROCEDURE — U0003 INFECTIOUS AGENT DETECTION BY NUCLEIC ACID (DNA OR RNA); SEVERE ACUTE RESPIRATORY SYNDROME CORONAVIRUS 2 (SARS-COV-2) (CORONAVIRUS DISEASE [COVID-19]), AMPLIFIED PROBE TECHNIQUE, MAKING USE OF HIGH THROUGHPUT TECHNOLOGIES AS DESCRIBED BY CMS-2020-01-R: HCPCS

## 2021-07-31 PROCEDURE — U0005 INFEC AGEN DETEC AMPLI PROBE: HCPCS

## 2021-08-01 LAB — SARS-COV-2 RNA RESP QL NAA+PROBE: NEGATIVE

## 2021-08-03 ENCOUNTER — ANCILLARY PROCEDURE (OUTPATIENT)
Dept: GENERAL RADIOLOGY | Facility: CLINIC | Age: 61
End: 2021-08-03
Attending: ORTHOPAEDIC SURGERY
Payer: OTHER MISCELLANEOUS

## 2021-08-03 DIAGNOSIS — M19.071 ARTHRITIS OF RIGHT ANKLE: ICD-10-CM

## 2021-08-03 PROCEDURE — 77002 NEEDLE LOCALIZATION BY XRAY: CPT | Performed by: RADIOLOGY

## 2021-08-03 PROCEDURE — 20605 DRAIN/INJ JOINT/BURSA W/O US: CPT | Mod: RT | Performed by: RADIOLOGY

## 2021-08-03 RX ORDER — IOPAMIDOL 408 MG/ML
10 INJECTION, SOLUTION INTRATHECAL ONCE
Status: COMPLETED | OUTPATIENT
Start: 2021-08-03 | End: 2021-08-03

## 2021-08-03 RX ORDER — LIDOCAINE HYDROCHLORIDE 10 MG/ML
30 INJECTION, SOLUTION EPIDURAL; INFILTRATION; INTRACAUDAL; PERINEURAL ONCE
Status: COMPLETED | OUTPATIENT
Start: 2021-08-03 | End: 2021-08-03

## 2021-08-03 RX ADMIN — IOPAMIDOL 2 ML: 408 INJECTION, SOLUTION INTRATHECAL at 10:18

## 2021-08-03 RX ADMIN — LIDOCAINE HYDROCHLORIDE 5 ML: 10 INJECTION, SOLUTION EPIDURAL; INFILTRATION; INTRACAUDAL; PERINEURAL at 10:18

## 2021-10-12 ENCOUNTER — TELEPHONE (OUTPATIENT)
Dept: ORTHOPEDICS | Facility: CLINIC | Age: 61
End: 2021-10-12

## 2021-10-12 NOTE — TELEPHONE ENCOUNTER
[facility]    Phone Message    May a detailed message be left on voicemail: yes     Reason for Call: Order(s): Other:   Reason for requested: Synvisc injection   Date needed: TODAY   Provider name: Clem       Action Taken: Message routed to:  [facility]    Travel Screening: Not Applicable     Patient would like Ofelia to call back once orders are placed

## 2021-10-25 DIAGNOSIS — M19.071 ARTHRITIS OF RIGHT ANKLE: Primary | ICD-10-CM

## 2021-11-02 ENCOUNTER — OFFICE VISIT (OUTPATIENT)
Dept: ORTHOPEDICS | Facility: CLINIC | Age: 61
End: 2021-11-02
Payer: OTHER MISCELLANEOUS

## 2021-11-02 ENCOUNTER — ANCILLARY PROCEDURE (OUTPATIENT)
Dept: GENERAL RADIOLOGY | Facility: CLINIC | Age: 61
End: 2021-11-02
Attending: ORTHOPAEDIC SURGERY
Payer: COMMERCIAL

## 2021-11-02 VITALS — WEIGHT: 128 LBS | HEIGHT: 66 IN | BODY MASS INDEX: 20.57 KG/M2

## 2021-11-02 DIAGNOSIS — M19.071 ARTHRITIS OF RIGHT ANKLE: ICD-10-CM

## 2021-11-02 DIAGNOSIS — M19.071 ARTHRITIS OF RIGHT ANKLE: Primary | ICD-10-CM

## 2021-11-02 PROCEDURE — 73610 X-RAY EXAM OF ANKLE: CPT | Mod: RT | Performed by: RADIOLOGY

## 2021-11-02 PROCEDURE — 99204 OFFICE O/P NEW MOD 45 MIN: CPT | Performed by: ORTHOPAEDIC SURGERY

## 2021-11-02 RX ORDER — PEN NEEDLE, DIABETIC 32GX 5/32"
NEEDLE, DISPOSABLE MISCELLANEOUS
COMMUNITY
Start: 2021-04-08

## 2021-11-02 RX ORDER — BLOOD SUGAR DIAGNOSTIC
STRIP MISCELLANEOUS
COMMUNITY
Start: 2021-09-12

## 2021-11-02 RX ORDER — BLOOD-GLUCOSE METER
EACH MISCELLANEOUS SEE ADMIN INSTRUCTIONS
COMMUNITY
Start: 2021-06-15

## 2021-11-02 RX ORDER — LEVOTHYROXINE SODIUM 125 UG/1
0.5 TABLET ORAL WEEKLY
COMMUNITY
Start: 2021-10-20

## 2021-11-02 RX ORDER — SIMVASTATIN 10 MG
10 TABLET ORAL EVERY EVENING
COMMUNITY
Start: 2021-09-29

## 2021-11-02 RX ORDER — LIOTHYRONINE SODIUM 5 UG/1
TABLET ORAL
COMMUNITY
Start: 2021-06-27 | End: 2023-08-11

## 2021-11-02 RX ORDER — LIFITEGRAST 50 MG/ML
1 SOLUTION/ DROPS OPHTHALMIC 2 TIMES DAILY
COMMUNITY
Start: 2021-09-29

## 2021-11-02 RX ORDER — L. ACIDOPHILUS/BIFIDO. LONGUM 15 MG
CAPSULE,DELAYED RELEASE (ENTERIC COATED) ORAL
COMMUNITY
Start: 2021-04-08

## 2021-11-02 RX ORDER — LOSARTAN POTASSIUM AND HYDROCHLOROTHIAZIDE 12.5; 1 MG/1; MG/1
TABLET ORAL
COMMUNITY
Start: 2021-10-20

## 2021-11-02 RX ORDER — BUPROPION HYDROCHLORIDE 150 MG/1
150 TABLET, EXTENDED RELEASE ORAL
COMMUNITY
Start: 2021-04-22 | End: 2023-08-11

## 2021-11-02 RX ORDER — PROCHLORPERAZINE 25 MG/1
SUPPOSITORY RECTAL
COMMUNITY
Start: 2021-10-19

## 2021-11-02 ASSESSMENT — MIFFLIN-ST. JEOR: SCORE: 1167.35

## 2021-11-02 NOTE — NURSING NOTE
"Reason For Visit:   Chief Complaint   Patient presents with     RECHECK     Discuss more injections for right ankle        Ht 1.676 m (5' 6\")   Wt 58.1 kg (128 lb)   BMI 20.66 kg/m      Pain Assessment  Patient Currently in Pain: Yes  0-10 Pain Scale: 4  Primary Pain Location: Ankle  Other Pain Locations: Right Ankle    Byron Reyes, EMT    "

## 2021-11-02 NOTE — PROGRESS NOTES
CHIEF COMPLAINT:  Right subtalar joint arthritis.    HISTORY OF PRESENT ILLNESS:  Mrs. Medina presents today for further evaluation.  The last time she was evaluated was in 2014.  The patient has been getting Synvisc injections along the subtalar and ankle joint for the past few years, every 3 months.  Reports to have tremendous amount of success with the injections.  Continues having well-controlled blood sugars.    PHYSICAL EXAMINATION:  On today's exam, she presents with full range of motion of the ankle joint with limited inversion and eversion.  There is pain with palpation of the anterior process of the calcaneus.    Today's x-rays show no acute pathology.    ASSESSMENT:  1.  Right subtalar joint arthritis  2.  Right calcaneonavicular coalition.    PLAN:  Discussed with the patient and her  that at this point we can continue with the injections.  I do believe that as long as they are working well enough to avoid surgical intervention, it is also reasonable.    We also had a brief conversation about the possibility of a calcaneonavicular coalition excision.  However, she would like to do some thinking in that regard.    All questions were answered.  Patient was pleased with the discussion.  The patient will proceed with injections of Synvisc every 3 months at the best of her convenience.  She has no restrictions.    TT:  20 minutes.  CT:  15 minutes.

## 2021-11-02 NOTE — LETTER
11/2/2021       RE: Aby Medina  [address] [place] [id number]    Dear Colleague,    Thank you for referring your patient, Aby Medina, to the [facility] [place]. Please see a copy of my visit note below.    CHIEF COMPLAINT:  Right subtalar joint arthritis.    HISTORY OF PRESENT ILLNESS:  Mrs. Medina presents today for further evaluation.  The last time she was evaluated was in 2014.  The patient has been getting Synvisc injections along the subtalar and ankle joint for the past few years, every 3 months.  Reports to have tremendous amount of success with the injections.  Continues having well-controlled blood sugars.    PHYSICAL EXAMINATION:  On today's exam, she presents with full range of motion of the ankle joint with limited inversion and eversion.  There is pain with palpation of the anterior process of the calcaneus.    Today's x-rays show no acute pathology.    ASSESSMENT:  1.  Right subtalar joint arthritis  2.  Right calcaneonavicular coalition.    PLAN:  Discussed with the patient and her  that at this point we can continue with the injections.  I do believe that as long as they are working well enough to avoid surgical intervention, it is also reasonable.    We also had a brief conversation about the possibility of a calcaneonavicular coalition excision.  However, she would like to do some thinking in that regard.    All questions were answered.  Patient was pleased with the discussion.  The patient will proceed with injections of Synvisc every 3 months at the best of her convenience.  She has no restrictions.    TT:  20 minutes.  CT:  15 minutes.      Eric Nj MD

## 2021-11-08 DIAGNOSIS — Z11.59 ENCOUNTER FOR SCREENING FOR OTHER VIRAL DISEASES: ICD-10-CM

## 2021-11-15 ENCOUNTER — LAB (OUTPATIENT)
Dept: LAB | Facility: CLINIC | Age: 61
End: 2021-11-15
Payer: OTHER MISCELLANEOUS

## 2021-11-15 DIAGNOSIS — Z11.59 ENCOUNTER FOR SCREENING FOR OTHER VIRAL DISEASES: ICD-10-CM

## 2021-11-15 PROCEDURE — U0005 INFEC AGEN DETEC AMPLI PROBE: HCPCS

## 2021-11-15 PROCEDURE — U0003 INFECTIOUS AGENT DETECTION BY NUCLEIC ACID (DNA OR RNA); SEVERE ACUTE RESPIRATORY SYNDROME CORONAVIRUS 2 (SARS-COV-2) (CORONAVIRUS DISEASE [COVID-19]), AMPLIFIED PROBE TECHNIQUE, MAKING USE OF HIGH THROUGHPUT TECHNOLOGIES AS DESCRIBED BY CMS-2020-01-R: HCPCS

## 2021-11-16 LAB — SARS-COV-2 RNA RESP QL NAA+PROBE: NEGATIVE

## 2021-11-18 ENCOUNTER — ANCILLARY PROCEDURE (OUTPATIENT)
Dept: GENERAL RADIOLOGY | Facility: CLINIC | Age: 61
End: 2021-11-18
Attending: ORTHOPAEDIC SURGERY
Payer: OTHER MISCELLANEOUS

## 2021-11-18 DIAGNOSIS — M19.071 ARTHRITIS OF RIGHT ANKLE: ICD-10-CM

## 2021-11-18 PROCEDURE — 20605 DRAIN/INJ JOINT/BURSA W/O US: CPT | Mod: RT | Performed by: RADIOLOGY

## 2021-11-18 PROCEDURE — 77002 NEEDLE LOCALIZATION BY XRAY: CPT | Performed by: RADIOLOGY

## 2021-11-18 RX ORDER — LIDOCAINE HYDROCHLORIDE 10 MG/ML
30 INJECTION, SOLUTION EPIDURAL; INFILTRATION; INTRACAUDAL; PERINEURAL ONCE
Status: COMPLETED | OUTPATIENT
Start: 2021-11-18 | End: 2021-11-18

## 2021-11-18 RX ORDER — IOPAMIDOL 408 MG/ML
10 INJECTION, SOLUTION INTRATHECAL ONCE
Status: COMPLETED | OUTPATIENT
Start: 2021-11-18 | End: 2021-11-18

## 2021-11-18 RX ADMIN — IOPAMIDOL 2 ML: 408 INJECTION, SOLUTION INTRATHECAL at 11:24

## 2021-11-18 RX ADMIN — LIDOCAINE HYDROCHLORIDE 2 ML: 10 INJECTION, SOLUTION EPIDURAL; INFILTRATION; INTRACAUDAL; PERINEURAL at 11:24

## 2022-01-13 DIAGNOSIS — M19.071 ARTHRITIS OF RIGHT ANKLE: Primary | ICD-10-CM

## 2022-02-24 ENCOUNTER — ANCILLARY PROCEDURE (OUTPATIENT)
Dept: GENERAL RADIOLOGY | Facility: CLINIC | Age: 62
End: 2022-02-24
Attending: ORTHOPAEDIC SURGERY
Payer: OTHER MISCELLANEOUS

## 2022-02-24 DIAGNOSIS — M19.071 ARTHRITIS OF RIGHT ANKLE: ICD-10-CM

## 2022-02-24 PROCEDURE — 20605 DRAIN/INJ JOINT/BURSA W/O US: CPT | Mod: RT | Performed by: RADIOLOGY

## 2022-02-24 PROCEDURE — 77002 NEEDLE LOCALIZATION BY XRAY: CPT | Performed by: RADIOLOGY

## 2022-02-24 RX ORDER — LIDOCAINE HYDROCHLORIDE 10 MG/ML
30 INJECTION, SOLUTION EPIDURAL; INFILTRATION; INTRACAUDAL; PERINEURAL ONCE
Status: COMPLETED | OUTPATIENT
Start: 2022-02-24 | End: 2022-02-24

## 2022-02-24 RX ORDER — IOPAMIDOL 408 MG/ML
10 INJECTION, SOLUTION INTRATHECAL ONCE
Status: COMPLETED | OUTPATIENT
Start: 2022-02-24 | End: 2022-02-24

## 2022-02-24 RX ADMIN — IOPAMIDOL 1 ML: 408 INJECTION, SOLUTION INTRATHECAL at 10:11

## 2022-02-24 RX ADMIN — LIDOCAINE HYDROCHLORIDE 1 ML: 10 INJECTION, SOLUTION EPIDURAL; INFILTRATION; INTRACAUDAL; PERINEURAL at 10:11

## 2022-04-19 ENCOUNTER — TELEPHONE (OUTPATIENT)
Dept: ORTHOPEDICS | Facility: CLINIC | Age: 62
End: 2022-04-19

## 2022-04-19 DIAGNOSIS — M19.071 ARTHRITIS OF RIGHT ANKLE: Primary | ICD-10-CM

## 2022-04-19 DIAGNOSIS — M19.90 OSTEOARTHRITIS: ICD-10-CM

## 2022-04-19 NOTE — TELEPHONE ENCOUNTER
I spoke with Aby and let her know that an order will be placed for Synvisc and she may call imaging any time to schedule an appointment for the injection to be done May 24th or later. Her last Synvisc injection was 2/24/2022. She thanked me and had no further questions.  Shannan Garrett ATC

## 2022-04-19 NOTE — TELEPHONE ENCOUNTER
[facility]    Phone Message    May a detailed message be left on voicemail: yes     Reason for Call: Other: pt wants Sol to call back and discuss putting in orders      Action Taken: Message routed to:  [facility]    Travel Screening: Not Applicable   Pt wants Nj to place Synvisc orders for her R ankle

## 2022-05-19 ENCOUNTER — ANCILLARY PROCEDURE (OUTPATIENT)
Dept: GENERAL RADIOLOGY | Facility: CLINIC | Age: 62
End: 2022-05-19
Attending: PHYSICIAN ASSISTANT
Payer: OTHER MISCELLANEOUS

## 2022-05-19 DIAGNOSIS — M19.071 ARTHRITIS OF RIGHT ANKLE: ICD-10-CM

## 2022-05-19 PROCEDURE — 77002 NEEDLE LOCALIZATION BY XRAY: CPT | Performed by: RADIOLOGY

## 2022-05-19 PROCEDURE — 20605 DRAIN/INJ JOINT/BURSA W/O US: CPT | Mod: RT | Performed by: RADIOLOGY

## 2022-05-19 RX ORDER — IOPAMIDOL 408 MG/ML
20 INJECTION, SOLUTION INTRATHECAL ONCE
Status: COMPLETED | OUTPATIENT
Start: 2022-05-19 | End: 2022-05-19

## 2022-05-19 RX ORDER — LIDOCAINE HYDROCHLORIDE 10 MG/ML
30 INJECTION, SOLUTION EPIDURAL; INFILTRATION; INTRACAUDAL; PERINEURAL ONCE
Status: COMPLETED | OUTPATIENT
Start: 2022-05-19 | End: 2022-05-19

## 2022-05-19 RX ADMIN — LIDOCAINE HYDROCHLORIDE 5 ML: 10 INJECTION, SOLUTION EPIDURAL; INFILTRATION; INTRACAUDAL; PERINEURAL at 10:15

## 2022-05-19 RX ADMIN — IOPAMIDOL 3 ML: 408 INJECTION, SOLUTION INTRATHECAL at 10:15

## 2022-06-10 ENCOUNTER — TELEPHONE (OUTPATIENT)
Dept: ORTHOPEDICS | Facility: CLINIC | Age: 62
End: 2022-06-10

## 2022-06-10 NOTE — TELEPHONE ENCOUNTER
Called patient but had to leave a VM. I think if the synvisc injections are failing to relieve her pain, it would be helpful for her to come in and see Dr. Nj to discuss other options. She may be a candidate for a steroid injection in these joints if she has not yet tried this. They have also previously discussed surgical options as well.     Jyothi Soriano PA-C

## 2022-06-10 NOTE — TELEPHONE ENCOUNTER
[facility]    Phone Message    May a detailed message be left on voicemail: yes     Reason for Call: Other: Patient is wondering if there are any arthritic medication to help with her pain because the synvisc injection she got on 05/19 did not help. Patient is requesting a call back from Sol.     Action Taken: Message routed to:  [facility]opedics    Travel Screening: Not Applicable

## 2022-06-10 NOTE — TELEPHONE ENCOUNTER
Spoke with the patient and explained that vicodin has tylenol in it. We discussed that oxycodone was sent today and she can use this to take increased dose 2 tabs q4-6 hours prn for the next few days. Called her pharmacy and confirmed the patient just had surgery and will need short term increase in pain management. Patient will need to pay out of pocket, but pharmacist okayed releasing the medication to her. I let the patient know.     Jyothi Soriano PA-C

## 2022-06-13 ENCOUNTER — TELEPHONE (OUTPATIENT)
Dept: ORTHOPEDICS | Facility: CLINIC | Age: 62
End: 2022-06-13

## 2022-06-13 NOTE — TELEPHONE ENCOUNTER
[facility]    Phone Message    May a detailed message be left on voicemail: yes     Reason for Call: Other: Patient stated that she already left a message on Friday and earlier today requesting a call back about her pain and no one returned her calls. It's getting towards the end of the day and she'd like a call back.     Action Taken: Message routed to:  [facility]    Travel Screening: Not Applicable

## 2022-06-13 NOTE — TELEPHONE ENCOUNTER
I spoke with Aby. She is requesting options to use for pain in between Synvisc injections. She is unwilling to try cortisone injections, she is diabetic and her doctor thinks it is not a good idea. She has tried over the counter things like Tylenol arthritis and canabis cream without success. I suggested a telephone encounter with Dr. Nj to see if there is something he can prescribe for her. She agreed. I have scheduled her for tomorrow, she has no other questions.   Shannan Garrett ATC

## 2022-06-13 NOTE — TELEPHONE ENCOUNTER
[facility]    Phone Message    May a detailed message be left on voicemail: yes     Reason for Call: Other: Patient left vm for a call back to discuss medications      Action Taken: Message routed to:  [facility]    Travel Screening: Not Applicable

## 2022-06-14 ENCOUNTER — VIRTUAL VISIT (OUTPATIENT)
Dept: ORTHOPEDICS | Facility: CLINIC | Age: 62
End: 2022-06-14
Payer: OTHER MISCELLANEOUS

## 2022-06-14 DIAGNOSIS — M25.571 PAIN IN JOINT, ANKLE AND FOOT, RIGHT: Primary | ICD-10-CM

## 2022-06-14 PROCEDURE — 99213 OFFICE O/P EST LOW 20 MIN: CPT | Mod: 95 | Performed by: ORTHOPAEDIC SURGERY

## 2022-06-14 RX ORDER — DICLOFENAC SODIUM 75 MG/1
75 TABLET, DELAYED RELEASE ORAL 2 TIMES DAILY
Qty: 60 TABLET | Refills: 0 | Status: SHIPPED | OUTPATIENT
Start: 2022-06-14 | End: 2022-06-14

## 2022-06-14 RX ORDER — DICLOFENAC SODIUM 75 MG/1
75 TABLET, DELAYED RELEASE ORAL 2 TIMES DAILY
Qty: 60 TABLET | Refills: 0 | Status: SHIPPED | OUTPATIENT
Start: 2022-06-14 | End: 2023-08-11

## 2022-06-14 NOTE — PROGRESS NOTES
CHIEF COMPLAINT:  Right ankle arthritis, right hindfoot arthritis.    HISTORY OF PRESENT ILLNESS:  Ms. Medina was interviewed via phone secondary to the pandemic.  The patient authorized the encounter.    The patient was interested in understanding what medication we would recommend for arthritis as apparently she is having more symptoms than usual in spite of having had a Synvisc injection in 05/2022.    RECOMMENDATIONS:  After a lengthy discussion with opted to proceed with the use of Voltaren pills.  A prescription for that medication was sent to the patient's pharmacy.    The patient will continue with activities as tolerated.  She has no restrictions.  She will follow up on a p.r.n. basis.  All questions were answered.    TT:  20 minutes.  CT:  15 minutes.

## 2022-06-14 NOTE — LETTER
6/14/2022         RE: Aby Medina  [address] [place] [phone]        Dear Colleague,    Thank you for referring your patient, Aby Medina, to the [facility] [place]. Please see a copy of my visit note below.    CHIEF COMPLAINT:  Right ankle arthritis, right hindfoot arthritis.    HISTORY OF PRESENT ILLNESS:  Ms. Medina was interviewed via phone secondary to the pandemic.  The patient authorized the encounter.    The patient was interested in understanding what medication we would recommend for arthritis as apparently she is having more symptoms than usual in spite of having had a Synvisc injection in 05/2022.    RECOMMENDATIONS:  After a lengthy discussion with opted to proceed with the use of Voltaren pills.  A prescription for that medication was sent to the patient's pharmacy.    The patient will continue with activities as tolerated.  She has no restrictions.  She will follow up on a p.r.n. basis.  All questions were answered.    TT:  20 minutes.  CT:  15 minutes.        Eric Nj MD

## 2022-07-26 ENCOUNTER — TELEPHONE (OUTPATIENT)
Dept: ORTHOPEDICS | Facility: CLINIC | Age: 62
End: 2022-07-26

## 2022-07-26 NOTE — TELEPHONE ENCOUNTER
[facility]    Phone Message    May a detailed message be left on voicemail: yes     Reason for Call: Other: Patient is requesting an order for injection for ankle. Would like a return call from Sol      Action Taken: Message routed to:  [facility]    Travel Screening: Not Applicable

## 2022-07-27 DIAGNOSIS — M25.571 PAIN IN JOINT, ANKLE AND FOOT, RIGHT: Primary | ICD-10-CM

## 2022-07-27 NOTE — TELEPHONE ENCOUNTER
Called the patient back and entered orders for right ankle injection. Let the patient know that she must wait until the 3 month arianne in order to schedule (approx 8/19/22) as her last injection took place 5/19/22.     Byron Reyes, EMT on 7/27/2022 at 9:53 AM

## 2022-07-27 NOTE — TELEPHONE ENCOUNTER
[facility]    Phone Message    May a detailed message be left on voicemail: yes     Reason for Call: Other: pstient is calling back to speak with Sol , says she really needs these orders placed      Action Taken: Message routed to:  [facility]    Travel Screening: Not Applicable     Please c/b once orders are ready

## 2022-09-02 ENCOUNTER — ANCILLARY PROCEDURE (OUTPATIENT)
Dept: GENERAL RADIOLOGY | Facility: CLINIC | Age: 62
End: 2022-09-02
Attending: ORTHOPAEDIC SURGERY
Payer: OTHER MISCELLANEOUS

## 2022-09-02 DIAGNOSIS — M25.571 PAIN IN JOINT, ANKLE AND FOOT, RIGHT: ICD-10-CM

## 2022-09-02 PROCEDURE — 77002 NEEDLE LOCALIZATION BY XRAY: CPT | Mod: GC | Performed by: RADIOLOGY

## 2022-09-02 PROCEDURE — 20605 DRAIN/INJ JOINT/BURSA W/O US: CPT | Mod: RT | Performed by: RADIOLOGY

## 2022-09-02 RX ORDER — BUPIVACAINE HYDROCHLORIDE 2.5 MG/ML
10 INJECTION, SOLUTION EPIDURAL; INFILTRATION; INTRACAUDAL ONCE
Status: DISCONTINUED | OUTPATIENT
Start: 2022-09-02 | End: 2022-09-02 | Stop reason: CLARIF

## 2022-09-02 RX ORDER — IOPAMIDOL 408 MG/ML
20 INJECTION, SOLUTION INTRATHECAL ONCE
Status: COMPLETED | OUTPATIENT
Start: 2022-09-02 | End: 2022-09-02

## 2022-09-02 RX ORDER — LIDOCAINE HYDROCHLORIDE 10 MG/ML
5 INJECTION, SOLUTION EPIDURAL; INFILTRATION; INTRACAUDAL; PERINEURAL ONCE
Status: COMPLETED | OUTPATIENT
Start: 2022-09-02 | End: 2022-09-02

## 2022-09-02 RX ADMIN — LIDOCAINE HYDROCHLORIDE 5 ML: 10 INJECTION, SOLUTION EPIDURAL; INFILTRATION; INTRACAUDAL; PERINEURAL at 11:30

## 2022-09-02 RX ADMIN — IOPAMIDOL 3 ML: 408 INJECTION, SOLUTION INTRATHECAL at 11:30

## 2022-10-18 ENCOUNTER — TELEPHONE (OUTPATIENT)
Dept: ORTHOPEDICS | Facility: CLINIC | Age: 62
End: 2022-10-18

## 2022-10-18 DIAGNOSIS — M25.571 PAIN IN JOINT, ANKLE AND FOOT, RIGHT: Primary | ICD-10-CM

## 2022-10-18 NOTE — TELEPHONE ENCOUNTER
Shannan put in new order. ATC called pt multiple times. Notified pt via VM.          -Deric, ATC- Orthopedics

## 2022-12-02 ENCOUNTER — ANCILLARY PROCEDURE (OUTPATIENT)
Dept: GENERAL RADIOLOGY | Facility: CLINIC | Age: 62
End: 2022-12-02
Attending: ORTHOPAEDIC SURGERY
Payer: OTHER MISCELLANEOUS

## 2022-12-02 DIAGNOSIS — M25.571 PAIN IN JOINT, ANKLE AND FOOT, RIGHT: ICD-10-CM

## 2022-12-02 PROCEDURE — 77002 NEEDLE LOCALIZATION BY XRAY: CPT | Mod: GC | Performed by: RADIOLOGY

## 2022-12-02 PROCEDURE — 20605 DRAIN/INJ JOINT/BURSA W/O US: CPT | Mod: RT | Performed by: RADIOLOGY

## 2022-12-02 RX ORDER — IOPAMIDOL 408 MG/ML
10 INJECTION, SOLUTION INTRATHECAL ONCE
Status: COMPLETED | OUTPATIENT
Start: 2022-12-02 | End: 2022-12-02

## 2022-12-02 RX ORDER — LIDOCAINE HYDROCHLORIDE 10 MG/ML
5 INJECTION, SOLUTION EPIDURAL; INFILTRATION; INTRACAUDAL; PERINEURAL ONCE
Status: COMPLETED | OUTPATIENT
Start: 2022-12-02 | End: 2022-12-02

## 2022-12-02 RX ADMIN — IOPAMIDOL 2 ML: 408 INJECTION, SOLUTION INTRATHECAL at 11:42

## 2022-12-02 RX ADMIN — LIDOCAINE HYDROCHLORIDE 5 ML: 10 INJECTION, SOLUTION EPIDURAL; INFILTRATION; INTRACAUDAL; PERINEURAL at 11:43

## 2023-01-23 ENCOUNTER — TELEPHONE (OUTPATIENT)
Dept: ORTHOPEDICS | Facility: CLINIC | Age: 63
End: 2023-01-23

## 2023-01-23 DIAGNOSIS — M25.571 PAIN IN JOINT, ANKLE AND FOOT, RIGHT: Primary | ICD-10-CM

## 2023-01-23 NOTE — TELEPHONE ENCOUNTER
[facility]    Phone Message    May a detailed message be left on voicemail: yes     Reason for Call: Other: Patient wants Synvisc injections in ankle and needs new order.      Wants to make appt for March.    Action Taken: Message routed to:  [facility]    Travel Screening: Not Applicable

## 2023-01-23 NOTE — TELEPHONE ENCOUNTER
Patient was called back and told that the new order has been placed in her chart and that she can call top schedule the injection for any time after March 2 3023.  She was agreeable with this plan and thankful for the call.

## 2023-03-07 ENCOUNTER — ANCILLARY PROCEDURE (OUTPATIENT)
Dept: GENERAL RADIOLOGY | Facility: CLINIC | Age: 63
End: 2023-03-07
Attending: ORTHOPAEDIC SURGERY
Payer: OTHER MISCELLANEOUS

## 2023-03-07 DIAGNOSIS — M25.571 PAIN IN JOINT, ANKLE AND FOOT, RIGHT: ICD-10-CM

## 2023-03-07 PROCEDURE — 20605 DRAIN/INJ JOINT/BURSA W/O US: CPT | Mod: RT | Performed by: RADIOLOGY

## 2023-03-07 PROCEDURE — 77002 NEEDLE LOCALIZATION BY XRAY: CPT | Performed by: RADIOLOGY

## 2023-03-07 RX ORDER — BUPIVACAINE HYDROCHLORIDE 2.5 MG/ML
10 INJECTION, SOLUTION EPIDURAL; INFILTRATION; INTRACAUDAL ONCE
Status: COMPLETED | OUTPATIENT
Start: 2023-03-07 | End: 2023-03-07

## 2023-03-07 RX ORDER — IOPAMIDOL 408 MG/ML
10 INJECTION, SOLUTION INTRATHECAL ONCE
Status: COMPLETED | OUTPATIENT
Start: 2023-03-07 | End: 2023-03-07

## 2023-03-07 RX ORDER — LIDOCAINE HYDROCHLORIDE 10 MG/ML
5 INJECTION, SOLUTION EPIDURAL; INFILTRATION; INTRACAUDAL; PERINEURAL ONCE
Status: COMPLETED | OUTPATIENT
Start: 2023-03-07 | End: 2023-03-07

## 2023-03-07 RX ADMIN — LIDOCAINE HYDROCHLORIDE 5 ML: 10 INJECTION, SOLUTION EPIDURAL; INFILTRATION; INTRACAUDAL; PERINEURAL at 10:40

## 2023-03-07 RX ADMIN — BUPIVACAINE HYDROCHLORIDE 25 MG: 2.5 INJECTION, SOLUTION EPIDURAL; INFILTRATION; INTRACAUDAL at 10:40

## 2023-03-07 RX ADMIN — IOPAMIDOL 10 ML: 408 INJECTION, SOLUTION INTRATHECAL at 10:40

## 2023-04-10 ENCOUNTER — TELEPHONE (OUTPATIENT)
Dept: ORTHOPEDICS | Facility: CLINIC | Age: 63
End: 2023-04-10

## 2023-04-10 NOTE — TELEPHONE ENCOUNTER
Patient Returning Call    Reason for call:  Patient gets ankle injection every 3 months; pt is going down to [place] and wanting for provider to send down an order. Also wanting provider to recommend someone in [place]. requesting callback   Information relayed to patient:  te to clinic     Patient has additional questions:  No      Okay to leave a detailed message?: Yes at Cell number on file:    Telephone Information:   Mobile [phone]

## 2023-04-10 NOTE — TELEPHONE ENCOUNTER
I spoke with Aby. She has been getting ankle injections every 3 months and said this will be the first year she is going to spend the winter in [place] and thinks that she may need to do her December injection down there and asked how that can be done. I told her to find an imaging location near where she will be staying and we can either mail the order to her or fax an order to the facility. I gave her the example of [facility] here, she should find a place similar to that down there. But since this is a ways out I suggested she contact us again in the fall asking us to send the order at that time. She understood.  Shannan Garrett ATC

## 2023-05-01 DIAGNOSIS — M19.071 ARTHRITIS OF RIGHT ANKLE: ICD-10-CM

## 2023-05-01 DIAGNOSIS — M25.571 PAIN IN JOINT, ANKLE AND FOOT, RIGHT: Primary | ICD-10-CM

## 2023-05-02 ENCOUNTER — OFFICE VISIT (OUTPATIENT)
Dept: ORTHOPEDICS | Facility: CLINIC | Age: 63
End: 2023-05-02
Payer: OTHER MISCELLANEOUS

## 2023-05-02 DIAGNOSIS — M19.071 ARTHRITIS OF RIGHT ANKLE: Primary | ICD-10-CM

## 2023-05-02 PROCEDURE — 99214 OFFICE O/P EST MOD 30 MIN: CPT | Performed by: ORTHOPAEDIC SURGERY

## 2023-05-02 NOTE — PROGRESS NOTES
CHIEF COMPLAINT:  Right ankle arthritis.    HISTORY OF PRESENT ILLNESS:  Mrs. Medina presents today for further follow-up in the company of her .  Reports to have no significant success from the Synvisc injections, which they have been performed for the past few years.  The patient still would like to postpone surgery as much as possible.    PHYSICAL EXAMINATION:  On today's exam, she presents with an ankle motion of no more than probably 15-20 degrees of combined plantar and dorsiflexion.  Presented with a relatively healthy talonavicular and subtalar joint motion, so the inversion is somewhat limited.    ASSESSMENT:  Right ankle arthritis.    PLAN:  I discussed with the patient that at this point, we could try cortisone injections as a way to postpone surgery as much as possible.  We had a lengthy conversation about the different treatment options from a surgical prospective between a replacement or a fusion.  Given the fact that she has a history of an infection in that ankle, I would propose to proceed with a fusion.    The patient will proceed with future injections across the ankle joint with cortisone and lidocaine at her convenience as long as they are 3 months apart.  The injections will be performed under fluoroscopic control.    The patient will follow up on a p.r.n. basis.  All questions were answered.    TT:  20 minutes.

## 2023-05-02 NOTE — LETTER
5/2/2023         RE: Aby Medina  [address] [place] [phone]        Dear Colleague,    Thank you for referring your patient, Aby Medina, to the [facility] [place]. Please see a copy of my visit note below.    CHIEF COMPLAINT:  Right ankle arthritis.    HISTORY OF PRESENT ILLNESS:  Mrs. Medina presents today for further follow-up in the company of her .  Reports to have no significant success from the Synvisc injections, which they have been performed for the past few years.  The patient still would like to postpone surgery as much as possible.    PHYSICAL EXAMINATION:  On today's exam, she presents with an ankle motion of no more than probably 15-20 degrees of combined plantar and dorsiflexion.  Presented with a relatively healthy talonavicular and subtalar joint motion, so the inversion is somewhat limited.    ASSESSMENT:  Right ankle arthritis.    PLAN:  I discussed with the patient that at this point, we could try cortisone injections as a way to postpone surgery as much as possible.  We had a lengthy conversation about the different treatment options from a surgical prospective between a replacement or a fusion.  Given the fact that she has a history of an infection in that ankle, I would propose to proceed with a fusion.    The patient will proceed with future injections across the ankle joint with cortisone and lidocaine at her convenience as long as they are 3 months apart.  The injections will be performed under fluoroscopic control.    The patient will follow up on a p.r.n. basis.  All questions were answered.    TT:  20 minutes.    Eric Nj MD

## 2023-05-02 NOTE — NURSING NOTE
Reason For Visit:   Chief Complaint   Patient presents with     RECHECK     right ankle intra-articular Synvisc One injection on 3/7/23       There were no vitals taken for this visit.    Patient reports that she received a right ankle intra-articular Synvisc One injection on 3/7/23. Pain relief was only present for about 1 month. She would like to discuss other options.    Shabana Mota, ATC

## 2023-05-10 ENCOUNTER — HOSPITAL ENCOUNTER (OUTPATIENT)
Dept: GENERAL RADIOLOGY | Facility: CLINIC | Age: 63
Discharge: HOME | End: 2023-05-10
Attending: ORTHOPAEDIC SURGERY
Payer: OTHER MISCELLANEOUS

## 2023-05-10 DIAGNOSIS — M19.071 ARTHRITIS OF RIGHT ANKLE: ICD-10-CM

## 2023-05-10 DIAGNOSIS — M25.571 PAIN IN JOINT, ANKLE AND FOOT, RIGHT: ICD-10-CM

## 2023-05-10 PROCEDURE — 77002 NEEDLE LOCALIZATION BY XRAY: CPT

## 2023-05-10 PROCEDURE — 250N000009 HC RX 250: Performed by: ORTHOPAEDIC SURGERY

## 2023-05-10 PROCEDURE — 73610 X-RAY EXAM OF ANKLE: CPT | Mod: RT

## 2023-05-10 PROCEDURE — 250N000011 HC RX IP 250 OP 636: Performed by: ORTHOPAEDIC SURGERY

## 2023-05-10 PROCEDURE — 255N000002 HC RX 255 OP 636: Performed by: ORTHOPAEDIC SURGERY

## 2023-05-10 RX ORDER — IOPAMIDOL 408 MG/ML
10 INJECTION, SOLUTION INTRATHECAL ONCE
Status: COMPLETED | OUTPATIENT
Start: 2023-05-10 | End: 2023-05-10

## 2023-05-10 RX ORDER — TRIAMCINOLONE ACETONIDE 40 MG/ML
40 INJECTION, SUSPENSION INTRA-ARTICULAR; INTRAMUSCULAR ONCE
Status: COMPLETED | OUTPATIENT
Start: 2023-05-10 | End: 2023-05-10

## 2023-05-10 RX ORDER — BUPIVACAINE HYDROCHLORIDE 2.5 MG/ML
10 INJECTION, SOLUTION INFILTRATION; PERINEURAL ONCE
Status: COMPLETED | OUTPATIENT
Start: 2023-05-10 | End: 2023-05-10

## 2023-05-10 RX ORDER — LIDOCAINE HYDROCHLORIDE 10 MG/ML
5 INJECTION, SOLUTION EPIDURAL; INFILTRATION; INTRACAUDAL; PERINEURAL ONCE
Status: COMPLETED | OUTPATIENT
Start: 2023-05-10 | End: 2023-05-10

## 2023-05-10 RX ADMIN — LIDOCAINE HYDROCHLORIDE 5 ML: 10 INJECTION, SOLUTION EPIDURAL; INFILTRATION; INTRACAUDAL; PERINEURAL at 10:42

## 2023-05-10 RX ADMIN — BUPIVACAINE HYDROCHLORIDE 25 MG: 2.5 INJECTION, SOLUTION EPIDURAL; INFILTRATION; INTRACAUDAL; PERINEURAL at 10:56

## 2023-05-10 RX ADMIN — TRIAMCINOLONE ACETONIDE 40 MG: 40 INJECTION, SUSPENSION INTRA-ARTICULAR; INTRAMUSCULAR at 10:41

## 2023-05-10 RX ADMIN — IOPAMIDOL 10 ML: 408 INJECTION, SOLUTION INTRATHECAL at 10:42

## 2023-05-16 ENCOUNTER — TELEPHONE (OUTPATIENT)
Dept: ORTHOPEDICS | Facility: CLINIC | Age: 63
End: 2023-05-16

## 2023-05-16 NOTE — TELEPHONE ENCOUNTER
LVM for patient asking her to call and schedule a telephone encounter with Dr. Nj to discuss the details of surgery. This can happen anytime in the next month or two. She needs to wait 3 months after having the injection to have surgery and she just had an injection 6 days ago.   Shannan Garrett ATC

## 2023-05-16 NOTE — TELEPHONE ENCOUNTER
[facility]    Phone Message    May a detailed message be left on voicemail: yes     Reason for Call: Other: Pt is asking for Dr Nj to reach out to endocrinologist because her injection sent her sugar levels way high. Pt is also asking for date of fusions      Action Taken: Other: [facility]    Travel Screening: Not Applicable

## 2023-05-23 ENCOUNTER — VIRTUAL VISIT (OUTPATIENT)
Dept: ORTHOPEDICS | Facility: CLINIC | Age: 63
End: 2023-05-23
Payer: OTHER MISCELLANEOUS

## 2023-05-23 ENCOUNTER — TELEPHONE (OUTPATIENT)
Dept: ORTHOPEDICS | Facility: CLINIC | Age: 63
End: 2023-05-23

## 2023-05-23 DIAGNOSIS — M25.571 PAIN IN JOINT, ANKLE AND FOOT, RIGHT: Primary | ICD-10-CM

## 2023-05-23 PROCEDURE — 99213 OFFICE O/P EST LOW 20 MIN: CPT | Mod: 95 | Performed by: ORTHOPAEDIC SURGERY

## 2023-05-23 NOTE — PROGRESS NOTES
Chief complaint right ankle arthritis    Mrs. Angela was interviewed via phone.  Patient authorized encounter.  The patient struggled to formulate questions seem to be fairly obtunded and did not remember the answers through our conversation.    I tried to cover the fact that an ankle fusion will not provoke any stresses into her knee or hip.  The mechanical transfers will happen towards the foot and not to worsen the leg.    She would like to have the surgery sooner rather than later because of the amount of pain that she is having.  I believe that it would be safe for her to have the surgery sometime around 4 July weekend.    Patient also requested for me to renew her insulin which I told her that I would not do.  I encouraged her to go to her primary care doctor or internal medicine doctor for that purpose    Patient will be reevaluated in clinic in a month from now.  At that time no x-rays will be obtained.  We will discuss in detail the process of undergoing a right ankle fusion.  In the meantime she has no restrictions.    I spent 17 minutes on the phone with Mrs. Angela today.

## 2023-05-23 NOTE — TELEPHONE ENCOUNTER
I called the patient and left them a message letting them know that Dr. Nj just wants to see them in about 1 month, it does not need to be exactly 1 month. Since Dr. Nj is only in clinic on Tuesday, June 27 may be the closest to a month the appointment can get, so the appointment that they already have scheduled is appropriate.    Delano Capone, EMT

## 2023-05-23 NOTE — TELEPHONE ENCOUNTER
[facility] Call Center    Phone Message    May a detailed message be left on voicemail: yes     Reason for Call: Other: Aby called she had an appointment today with Dr. Nj and he wants to see her in person on 6/23/23. I looked and did not find any appointments available that day it looks like his first opening is 6/27/23. Please call and discuss with patient.     Action Taken: Other: [facility]    Travel Screening: Not Applicable

## 2023-05-23 NOTE — LETTER
5/23/2023         RE: Aby Medina  [address] [place] [phone]        Dear Colleague,    Thank you for referring your patient, Aby Medina, to the [facility] [place]. Please see a copy of my visit note below.    Chief complaint right ankle arthritis    Mrs. Angela was interviewed via phone.  Patient authorized encounter.  The patient struggled to formulate questions seem to be fairly obtunded and did not remember the answers through our conversation.    I tried to cover the fact that an ankle fusion will not provoke any stresses into her knee or hip.  The mechanical transfers will happen towards the foot and not to worsen the leg.    She would like to have the surgery sooner rather than later because of the amount of pain that she is having.  I believe that it would be safe for her to have the surgery sometime around 4 July weekend.    Patient also requested for me to renew her insulin which I told her that I would not do.  I encouraged her to go to her primary care doctor or internal medicine doctor for that purpose    Patient will be reevaluated in clinic in a month from now.  At that time no x-rays will be obtained.  We will discuss in detail the process of undergoing a right ankle fusion.  In the meantime she has no restrictions.    I spent 17 minutes on the phone with Mrs. Angela today.          Eric Nj MD

## 2023-05-30 ENCOUNTER — TELEPHONE (OUTPATIENT)
Dept: ORTHOPEDICS | Facility: CLINIC | Age: 63
End: 2023-05-30

## 2023-05-30 DIAGNOSIS — M25.571 PAIN IN JOINT, ANKLE AND FOOT, RIGHT: Primary | ICD-10-CM

## 2023-05-30 NOTE — TELEPHONE ENCOUNTER
Returned patient call. She is requesting pain medication to manage the pain in her ankle until her appointment in June.     Patient received a cortisone injection 5/10. It did not relieve her pain and caused her blood sugars to rise.    She is currently alternating between Tylenol and Ibuprofen. Voltaren gel has not helped in the past. Inquiring if she could have another Synvisc injection. Will review with Dr. Nj.      Tara Holter, RNCC

## 2023-05-30 NOTE — TELEPHONE ENCOUNTER
[facility] Call Center    Phone Message    May a detailed message be left on voicemail: yes     Reason for Call: Other: Asking for pain med not oxy to get her through to her next appt      Action Taken: Other: ortho     Travel Screening: Not Applicable

## 2023-05-31 ENCOUNTER — TELEPHONE (OUTPATIENT)
Dept: ORTHOPEDICS | Facility: CLINIC | Age: 63
End: 2023-05-31

## 2023-05-31 NOTE — TELEPHONE ENCOUNTER
Discussed with Dr. Nj. He is okay with ordering a Synvisc injection since the steroid injection provided no relief. Phone number given to scheduled injection.    Tara Holter, RNCC

## 2023-05-31 NOTE — TELEPHONE ENCOUNTER
[facility] Call Center    Phone Message    May a detailed message be left on voicemail: yes     Reason for Call: Other: Aby Leger is calling back from yesterday had spoken to someone on the team and they were suppose to call her back and no one did. Please call her. Thank you, mAelia    In regards to pain medication    Action Taken: Other: [facility]    Travel Screening: Not Applicable

## 2023-06-01 ENCOUNTER — TELEPHONE (OUTPATIENT)
Dept: ORTHOPEDICS | Facility: CLINIC | Age: 63
End: 2023-06-01

## 2023-06-01 NOTE — TELEPHONE ENCOUNTER
[facility] Call Center    Phone Message    May a detailed message be left on voicemail: yes     Reason for Call: Other: Aby called she would like Dr. Kilgore nurse to call her because Dr. Nj wants her to have a synvisc injection prior to seeing him but she was not able to get an appointment until 6/30 so she had to cancel her appointment with Dr. Nj. Please call to discuss     Action Taken: Other: [facility]    Travel Screening: Not Applicable

## 2023-06-01 NOTE — TELEPHONE ENCOUNTER
Returned patient call. Patient is scheduled for an injection on June 30th. Relayed Dr. Nj is not requiring she get the injection prior to your appointment. Patient declined to reschedule appointment. Offered to assist exploring different locations to see if her injection could be scheduled sooner. Told her to call the clinic if she changes her mind.    Tara Holter, RNCC

## 2023-06-06 NOTE — TELEPHONE ENCOUNTER
Called patient regarding her request for pain medication. Advised she schedule an appointment with Dr. Nj. Plan from virtual visit 5/23 was to follow up in clinic in a month. Patient cancelled the appointment.    She has had relief with OTC lidocaine patches. She will wait until after injection to see Dr. Nj. Patient will call and make an appointment at a later date.     Tara Holter, RNCC

## 2023-06-30 ENCOUNTER — ANCILLARY PROCEDURE (OUTPATIENT)
Dept: INTERVENTIONAL RADIOLOGY/VASCULAR | Facility: CLINIC | Age: 63
End: 2023-06-30
Attending: ORTHOPAEDIC SURGERY
Payer: OTHER MISCELLANEOUS

## 2023-06-30 DIAGNOSIS — M25.571 PAIN IN JOINT, ANKLE AND FOOT, RIGHT: ICD-10-CM

## 2023-06-30 PROCEDURE — 77002 NEEDLE LOCALIZATION BY XRAY: CPT | Performed by: RADIOLOGY

## 2023-06-30 PROCEDURE — 20605 DRAIN/INJ JOINT/BURSA W/O US: CPT | Mod: RT | Performed by: RADIOLOGY

## 2023-06-30 RX ORDER — IOPAMIDOL 408 MG/ML
10 INJECTION, SOLUTION INTRATHECAL ONCE
Status: COMPLETED | OUTPATIENT
Start: 2023-06-30 | End: 2023-06-30

## 2023-06-30 RX ADMIN — IOPAMIDOL 2 ML: 408 INJECTION, SOLUTION INTRATHECAL at 10:28

## 2023-07-20 ENCOUNTER — MEDICAL CORRESPONDENCE (OUTPATIENT)
Dept: HEALTH INFORMATION MANAGEMENT | Facility: CLINIC | Age: 63
End: 2023-07-20

## 2023-08-03 ENCOUNTER — TRANSFERRED RECORDS (OUTPATIENT)
Dept: HEALTH INFORMATION MANAGEMENT | Facility: CLINIC | Age: 63
End: 2023-08-03

## 2023-08-11 ENCOUNTER — ANESTHESIA EVENT (OUTPATIENT)
Dept: SURGERY | Facility: CLINIC | Age: 63
DRG: 469 | End: 2023-08-11
Payer: OTHER MISCELLANEOUS

## 2023-08-11 RX ORDER — INSULIN LISPRO-AABC 100 [IU]/ML
INJECTION, SOLUTION SUBCUTANEOUS
COMMUNITY

## 2023-08-11 RX ORDER — CALCIUM CARBONATE/VITAMIN D3 500-10/5ML
1 LIQUID (ML) ORAL 2 TIMES DAILY
COMMUNITY

## 2023-08-11 RX ORDER — ACETAMINOPHEN 500 MG
500-1000 TABLET ORAL EVERY 6 HOURS PRN
Status: ON HOLD | COMMUNITY
End: 2023-08-15

## 2023-08-11 NOTE — PROGRESS NOTES
PTA medications updated by Medication Scribe prior to surgery via phone call with patient (last doses completed by Nurse)     Medication history sources: Patient, Surescripts, and H&P  In the past week, patient estimated taking medication this percent of the time: Greater than 90%      Significant changes made to the medication list:  Patient reports no longer taking the following meds (med scribe removed from PTA med list): Wellbutrin, Voltaren, Fish Oil, Lisinopril, Cytomel      Additional medication history information:   Patient was advised to bring: Xiidra OP soln    Medication reconciliation completed by provider prior to medication history? No    Time spent in this activity: 60 minutes    The information provided in this note is only as accurate as the sources available at the time of update(s)      Prior to Admission medications    Medication Sig Last Dose Taking? Auth Provider Long Term End Date   acetaminophen (TYLENOL) 500 MG tablet Take 500-1,000 mg by mouth every 6 hours as needed for mild pain Unknown at PRN Yes Reported, Patient     Cholecalciferol (VITAMIN D3) 250 MCG (68550 UT) TABS Take 1 capsule by mouth every evening  at PM Yes Reported, Patient     insulin glargine (LANTUS SOLOSTAR) 100 UNIT/ML pen 16 Units every morning  at AM Yes Reported, Patient Yes    Insulin Lispro-aabc, 1 U Dial, (LYUMJELUIS ANGEL KWIKPEN) 100 UNIT/ML SOPN 1 unit/10 grams of carbs, with a correction factor of 30:up to 33 units in divided doses  at PM Yes Reported, Patient     levothyroxine (SYNTHROID/LEVOTHROID) 125 MCG tablet Take 0.5 tablets by mouth once a week (Sundays:Brand Synthroid)  at AM Yes Reported, Patient     levothyroxine (SYNTHROID/LEVOTHROID) 125 MCG tablet Take 125 mcg by mouth six times a week (Monday, Tuesday, Wednesday, Thursday, Friday & Saturdays:Brand Synthroid)  at AM Yes Maria Antonia Dover MD     losartan-hydrochlorothiazide (HYZAAR) 100-12.5 MG tablet   at AM Yes Reported, Patient Yes    magnesium oxide 400 MG  CAPS Take 1 capsule by mouth 2 times daily  at PM Yes Reported, Patient     simvastatin (ZOCOR) 10 MG tablet Take 10 mg by mouth every evening  at PM Yes Reported, Patient Yes    VITAMIN A PO Take 2,400 Units by mouth daily 8/11/2023 at AM Yes Reported, Patient     XIIDRA 5 % opthalmic solution Place 1 drop into both eyes 2 times daily  at AM Yes Reported, Patient     ACCU-CHEK GUIDE test strip    Reported, Patient     BD PEN NEEDLE KELLY 2ND GEN 32G X 4 MM miscellaneous USE ONE PEN NEEDLE 4 TIMES DAILY AS DIRECTED   Reported, Patient     blood glucose ([employer] BLOOD GLUCOSE TEST) test strip Test 10 times per day.   Reported, Patient     Blood Glucose Monitoring Suppl (ACCU-CHEK GUIDE) w/Device KIT See Admin Instructions   Reported, Patient     Continuous Blood Gluc  (DEXCOM G6 ) BEST As directed.   Reported, Patient

## 2023-08-14 ENCOUNTER — ANESTHESIA (OUTPATIENT)
Dept: SURGERY | Facility: CLINIC | Age: 63
DRG: 469 | End: 2023-08-14
Payer: OTHER MISCELLANEOUS

## 2023-08-14 ENCOUNTER — HOSPITAL ENCOUNTER (INPATIENT)
Facility: CLINIC | Age: 63
LOS: 1 days | Discharge: HOME | DRG: 469 | End: 2023-08-15
Attending: ORTHOPAEDIC SURGERY | Admitting: ORTHOPAEDIC SURGERY
Payer: OTHER MISCELLANEOUS

## 2023-08-14 ENCOUNTER — APPOINTMENT (OUTPATIENT)
Dept: GENERAL RADIOLOGY | Facility: CLINIC | Age: 63
DRG: 469 | End: 2023-08-14
Attending: ORTHOPAEDIC SURGERY
Payer: OTHER MISCELLANEOUS

## 2023-08-14 DIAGNOSIS — Z96.661 H/O TOTAL ANKLE REPLACEMENT, RIGHT: Primary | ICD-10-CM

## 2023-08-14 PROBLEM — Z96.669 H/O TOTAL ANKLE REPLACEMENT: Status: ACTIVE | Noted: 2023-08-14

## 2023-08-14 LAB — GLUCOSE BLDC GLUCOMTR-MCNC: 184 MG/DL (ref 70–99)

## 2023-08-14 PROCEDURE — 99207 PR NO BILLABLE SERVICE THIS VISIT: CPT | Performed by: PHYSICIAN ASSISTANT

## 2023-08-14 PROCEDURE — 272N000001 HC OR GENERAL SUPPLY STERILE: Performed by: ORTHOPAEDIC SURGERY

## 2023-08-14 PROCEDURE — 370N000017 HC ANESTHESIA TECHNICAL FEE, PER MIN: Performed by: ORTHOPAEDIC SURGERY

## 2023-08-14 PROCEDURE — 258N000003 HC RX IP 258 OP 636: Performed by: ANESTHESIOLOGY

## 2023-08-14 PROCEDURE — 258N000003 HC RX IP 258 OP 636: Performed by: REGISTERED NURSE

## 2023-08-14 PROCEDURE — 710N000009 HC RECOVERY PHASE 1, LEVEL 1, PER MIN: Performed by: ORTHOPAEDIC SURGERY

## 2023-08-14 PROCEDURE — 250N000011 HC RX IP 250 OP 636: Performed by: ANESTHESIOLOGY

## 2023-08-14 PROCEDURE — C1776 JOINT DEVICE (IMPLANTABLE): HCPCS | Performed by: ORTHOPAEDIC SURGERY

## 2023-08-14 PROCEDURE — 250N000011 HC RX IP 250 OP 636: Performed by: PHYSICIAN ASSISTANT

## 2023-08-14 PROCEDURE — 999N000179 XR SURGERY CARM FLUORO LESS THAN 5 MIN W STILLS

## 2023-08-14 PROCEDURE — 250N000009 HC RX 250: Performed by: REGISTERED NURSE

## 2023-08-14 PROCEDURE — 82962 GLUCOSE BLOOD TEST: CPT

## 2023-08-14 PROCEDURE — 360N000084 HC SURGERY LEVEL 4 W/ FLUORO, PER MIN: Performed by: ORTHOPAEDIC SURGERY

## 2023-08-14 PROCEDURE — 250N000009 HC RX 250: Performed by: ORTHOPAEDIC SURGERY

## 2023-08-14 PROCEDURE — 250N000011 HC RX IP 250 OP 636: Performed by: REGISTERED NURSE

## 2023-08-14 PROCEDURE — 0SRF0JA REPLACEMENT OF RIGHT ANKLE JOINT WITH SYNTHETIC SUBSTITUTE, UNCEMENTED, OPEN APPROACH: ICD-10-PCS | Performed by: ORTHOPAEDIC SURGERY

## 2023-08-14 PROCEDURE — 250N000013 HC RX MED GY IP 250 OP 250 PS 637: Performed by: PHYSICIAN ASSISTANT

## 2023-08-14 PROCEDURE — 250N000025 HC SEVOFLURANE, PER MIN: Performed by: ORTHOPAEDIC SURGERY

## 2023-08-14 PROCEDURE — 250N000013 HC RX MED GY IP 250 OP 250 PS 637: Performed by: ANESTHESIOLOGY

## 2023-08-14 PROCEDURE — 999N000141 HC STATISTIC PRE-PROCEDURE NURSING ASSESSMENT: Performed by: ORTHOPAEDIC SURGERY

## 2023-08-14 PROCEDURE — 258N000003 HC RX IP 258 OP 636: Performed by: PHYSICIAN ASSISTANT

## 2023-08-14 PROCEDURE — 271N000001 HC OR GENERAL SUPPLY NON-STERILE: Performed by: ORTHOPAEDIC SURGERY

## 2023-08-14 DEVICE — TIBIAL TRAY, XL, SIZE 1
Type: IMPLANTABLE DEVICE | Site: ANKLE | Status: FUNCTIONAL
Brand: SALTO TALARIS®

## 2023-08-14 DEVICE — IMPLANTABLE DEVICE: Type: IMPLANTABLE DEVICE | Site: ANKLE | Status: FUNCTIONAL

## 2023-08-14 DEVICE — INSERT, SIZE 1, RIGHT, TH8
Type: IMPLANTABLE DEVICE | Site: ANKLE | Status: FUNCTIONAL
Brand: SALTO TALARIS®

## 2023-08-14 RX ORDER — CEFAZOLIN SODIUM/WATER 2 G/20 ML
2 SYRINGE (ML) INTRAVENOUS SEE ADMIN INSTRUCTIONS
Status: DISCONTINUED | OUTPATIENT
Start: 2023-08-14 | End: 2023-08-14 | Stop reason: HOSPADM

## 2023-08-14 RX ORDER — ASPIRIN 81 MG/1
81 TABLET ORAL 2 TIMES DAILY
Status: DISCONTINUED | OUTPATIENT
Start: 2023-08-14 | End: 2023-08-15 | Stop reason: HOSPADM

## 2023-08-14 RX ORDER — OXYCODONE HYDROCHLORIDE 5 MG/1
5 TABLET ORAL EVERY 4 HOURS PRN
Status: DISCONTINUED | OUTPATIENT
Start: 2023-08-14 | End: 2023-08-15 | Stop reason: HOSPADM

## 2023-08-14 RX ORDER — LIDOCAINE 40 MG/G
CREAM TOPICAL
Status: DISCONTINUED | OUTPATIENT
Start: 2023-08-14 | End: 2023-08-14 | Stop reason: HOSPADM

## 2023-08-14 RX ORDER — HYDROMORPHONE HCL IN WATER/PF 6 MG/30 ML
0.4 PATIENT CONTROLLED ANALGESIA SYRINGE INTRAVENOUS EVERY 5 MIN PRN
Status: DISCONTINUED | OUTPATIENT
Start: 2023-08-14 | End: 2023-08-14 | Stop reason: HOSPADM

## 2023-08-14 RX ORDER — FENTANYL CITRATE 50 UG/ML
INJECTION, SOLUTION INTRAMUSCULAR; INTRAVENOUS PRN
Status: DISCONTINUED | OUTPATIENT
Start: 2023-08-14 | End: 2023-08-14

## 2023-08-14 RX ORDER — NALOXONE HYDROCHLORIDE 0.4 MG/ML
0.4 INJECTION, SOLUTION INTRAMUSCULAR; INTRAVENOUS; SUBCUTANEOUS
Status: DISCONTINUED | OUTPATIENT
Start: 2023-08-14 | End: 2023-08-15 | Stop reason: HOSPADM

## 2023-08-14 RX ORDER — HYDRALAZINE HYDROCHLORIDE 20 MG/ML
2.5-5 INJECTION INTRAMUSCULAR; INTRAVENOUS EVERY 10 MIN PRN
Status: DISCONTINUED | OUTPATIENT
Start: 2023-08-14 | End: 2023-08-14 | Stop reason: HOSPADM

## 2023-08-14 RX ORDER — ONDANSETRON 2 MG/ML
INJECTION INTRAMUSCULAR; INTRAVENOUS PRN
Status: DISCONTINUED | OUTPATIENT
Start: 2023-08-14 | End: 2023-08-14

## 2023-08-14 RX ORDER — DIMENHYDRINATE 50 MG/ML
25 INJECTION, SOLUTION INTRAMUSCULAR; INTRAVENOUS
Status: DISCONTINUED | OUTPATIENT
Start: 2023-08-14 | End: 2023-08-14 | Stop reason: HOSPADM

## 2023-08-14 RX ORDER — ONDANSETRON 2 MG/ML
4 INJECTION INTRAMUSCULAR; INTRAVENOUS EVERY 30 MIN PRN
Status: DISCONTINUED | OUTPATIENT
Start: 2023-08-14 | End: 2023-08-14 | Stop reason: HOSPADM

## 2023-08-14 RX ORDER — ONDANSETRON 2 MG/ML
4 INJECTION INTRAMUSCULAR; INTRAVENOUS EVERY 6 HOURS PRN
Status: DISCONTINUED | OUTPATIENT
Start: 2023-08-14 | End: 2023-08-15 | Stop reason: HOSPADM

## 2023-08-14 RX ORDER — NALOXONE HYDROCHLORIDE 0.4 MG/ML
0.2 INJECTION, SOLUTION INTRAMUSCULAR; INTRAVENOUS; SUBCUTANEOUS
Status: DISCONTINUED | OUTPATIENT
Start: 2023-08-14 | End: 2023-08-15 | Stop reason: HOSPADM

## 2023-08-14 RX ORDER — ACETAMINOPHEN 325 MG/1
975 TABLET ORAL EVERY 8 HOURS
Status: DISCONTINUED | OUTPATIENT
Start: 2023-08-14 | End: 2023-08-15 | Stop reason: HOSPADM

## 2023-08-14 RX ORDER — POLYETHYLENE GLYCOL 3350 17 G/17G
17 POWDER, FOR SOLUTION ORAL DAILY
Status: DISCONTINUED | OUTPATIENT
Start: 2023-08-15 | End: 2023-08-15 | Stop reason: HOSPADM

## 2023-08-14 RX ORDER — HYDROMORPHONE HCL IN WATER/PF 6 MG/30 ML
0.2 PATIENT CONTROLLED ANALGESIA SYRINGE INTRAVENOUS EVERY 5 MIN PRN
Status: DISCONTINUED | OUTPATIENT
Start: 2023-08-14 | End: 2023-08-14 | Stop reason: HOSPADM

## 2023-08-14 RX ORDER — PROPOFOL 10 MG/ML
INJECTION, EMULSION INTRAVENOUS CONTINUOUS PRN
Status: DISCONTINUED | OUTPATIENT
Start: 2023-08-14 | End: 2023-08-14

## 2023-08-14 RX ORDER — SODIUM CHLORIDE, SODIUM LACTATE, POTASSIUM CHLORIDE, CALCIUM CHLORIDE 600; 310; 30; 20 MG/100ML; MG/100ML; MG/100ML; MG/100ML
INJECTION, SOLUTION INTRAVENOUS CONTINUOUS
Status: DISCONTINUED | OUTPATIENT
Start: 2023-08-14 | End: 2023-08-14 | Stop reason: HOSPADM

## 2023-08-14 RX ORDER — AMOXICILLIN 250 MG
1 CAPSULE ORAL 2 TIMES DAILY
Status: DISCONTINUED | OUTPATIENT
Start: 2023-08-14 | End: 2023-08-15 | Stop reason: HOSPADM

## 2023-08-14 RX ORDER — HYDROMORPHONE HCL IN WATER/PF 6 MG/30 ML
0.2 PATIENT CONTROLLED ANALGESIA SYRINGE INTRAVENOUS
Status: DISCONTINUED | OUTPATIENT
Start: 2023-08-14 | End: 2023-08-15 | Stop reason: HOSPADM

## 2023-08-14 RX ORDER — ACETAMINOPHEN 325 MG/1
975 TABLET ORAL ONCE
Status: COMPLETED | OUTPATIENT
Start: 2023-08-14 | End: 2023-08-14

## 2023-08-14 RX ORDER — MAGNESIUM HYDROXIDE 1200 MG/15ML
LIQUID ORAL PRN
Status: DISCONTINUED | OUTPATIENT
Start: 2023-08-14 | End: 2023-08-14 | Stop reason: HOSPADM

## 2023-08-14 RX ORDER — FENTANYL CITRATE 0.05 MG/ML
25 INJECTION, SOLUTION INTRAMUSCULAR; INTRAVENOUS EVERY 5 MIN PRN
Status: DISCONTINUED | OUTPATIENT
Start: 2023-08-14 | End: 2023-08-14 | Stop reason: HOSPADM

## 2023-08-14 RX ORDER — ONDANSETRON 4 MG/1
4 TABLET, ORALLY DISINTEGRATING ORAL EVERY 30 MIN PRN
Status: DISCONTINUED | OUTPATIENT
Start: 2023-08-14 | End: 2023-08-14 | Stop reason: HOSPADM

## 2023-08-14 RX ORDER — ACETAMINOPHEN 325 MG/1
650 TABLET ORAL EVERY 4 HOURS PRN
Status: DISCONTINUED | OUTPATIENT
Start: 2023-08-17 | End: 2023-08-15 | Stop reason: HOSPADM

## 2023-08-14 RX ORDER — BISACODYL 10 MG
10 SUPPOSITORY, RECTAL RECTAL DAILY PRN
Status: DISCONTINUED | OUTPATIENT
Start: 2023-08-14 | End: 2023-08-15 | Stop reason: HOSPADM

## 2023-08-14 RX ORDER — FENTANYL CITRATE 0.05 MG/ML
50 INJECTION, SOLUTION INTRAMUSCULAR; INTRAVENOUS
Status: DISCONTINUED | OUTPATIENT
Start: 2023-08-14 | End: 2023-08-14 | Stop reason: HOSPADM

## 2023-08-14 RX ORDER — CEFAZOLIN SODIUM/WATER 2 G/20 ML
2 SYRINGE (ML) INTRAVENOUS
Status: COMPLETED | OUTPATIENT
Start: 2023-08-14 | End: 2023-08-14

## 2023-08-14 RX ORDER — OXYCODONE HYDROCHLORIDE 5 MG/1
10 TABLET ORAL EVERY 4 HOURS PRN
Status: DISCONTINUED | OUTPATIENT
Start: 2023-08-14 | End: 2023-08-15 | Stop reason: HOSPADM

## 2023-08-14 RX ORDER — DEXTROSE MONOHYDRATE 25 G/50ML
25-50 INJECTION, SOLUTION INTRAVENOUS
Status: DISCONTINUED | OUTPATIENT
Start: 2023-08-14 | End: 2023-08-15 | Stop reason: HOSPADM

## 2023-08-14 RX ORDER — LIDOCAINE 40 MG/G
CREAM TOPICAL
Status: DISCONTINUED | OUTPATIENT
Start: 2023-08-14 | End: 2023-08-15 | Stop reason: HOSPADM

## 2023-08-14 RX ORDER — PROCHLORPERAZINE MALEATE 10 MG
10 TABLET ORAL EVERY 6 HOURS PRN
Status: DISCONTINUED | OUTPATIENT
Start: 2023-08-14 | End: 2023-08-15 | Stop reason: HOSPADM

## 2023-08-14 RX ORDER — FENTANYL CITRATE 0.05 MG/ML
50 INJECTION, SOLUTION INTRAMUSCULAR; INTRAVENOUS EVERY 5 MIN PRN
Status: DISCONTINUED | OUTPATIENT
Start: 2023-08-14 | End: 2023-08-14 | Stop reason: HOSPADM

## 2023-08-14 RX ORDER — CEFAZOLIN SODIUM 1 G/3ML
1 INJECTION, POWDER, FOR SOLUTION INTRAMUSCULAR; INTRAVENOUS EVERY 8 HOURS
Status: COMPLETED | OUTPATIENT
Start: 2023-08-14 | End: 2023-08-15

## 2023-08-14 RX ORDER — HYDROXYZINE HYDROCHLORIDE 25 MG/1
25 TABLET, FILM COATED ORAL EVERY 6 HOURS PRN
Status: DISCONTINUED | OUTPATIENT
Start: 2023-08-14 | End: 2023-08-15 | Stop reason: HOSPADM

## 2023-08-14 RX ORDER — SODIUM CHLORIDE, SODIUM LACTATE, POTASSIUM CHLORIDE, CALCIUM CHLORIDE 600; 310; 30; 20 MG/100ML; MG/100ML; MG/100ML; MG/100ML
INJECTION, SOLUTION INTRAVENOUS CONTINUOUS
Status: DISCONTINUED | OUTPATIENT
Start: 2023-08-14 | End: 2023-08-15 | Stop reason: HOSPADM

## 2023-08-14 RX ORDER — LABETALOL HYDROCHLORIDE 5 MG/ML
10 INJECTION, SOLUTION INTRAVENOUS
Status: DISCONTINUED | OUTPATIENT
Start: 2023-08-14 | End: 2023-08-14 | Stop reason: HOSPADM

## 2023-08-14 RX ORDER — HYDROMORPHONE HCL IN WATER/PF 6 MG/30 ML
0.4 PATIENT CONTROLLED ANALGESIA SYRINGE INTRAVENOUS
Status: DISCONTINUED | OUTPATIENT
Start: 2023-08-14 | End: 2023-08-15 | Stop reason: HOSPADM

## 2023-08-14 RX ORDER — NICOTINE POLACRILEX 4 MG
15-30 LOZENGE BUCCAL
Status: DISCONTINUED | OUTPATIENT
Start: 2023-08-14 | End: 2023-08-15 | Stop reason: HOSPADM

## 2023-08-14 RX ORDER — HYDROXYZINE HYDROCHLORIDE 25 MG/1
25 TABLET, FILM COATED ORAL EVERY 6 HOURS PRN
Status: DISCONTINUED | OUTPATIENT
Start: 2023-08-14 | End: 2023-08-14 | Stop reason: HOSPADM

## 2023-08-14 RX ORDER — LIDOCAINE HYDROCHLORIDE 20 MG/ML
INJECTION, SOLUTION INFILTRATION; PERINEURAL PRN
Status: DISCONTINUED | OUTPATIENT
Start: 2023-08-14 | End: 2023-08-14

## 2023-08-14 RX ORDER — PROPOFOL 10 MG/ML
INJECTION, EMULSION INTRAVENOUS PRN
Status: DISCONTINUED | OUTPATIENT
Start: 2023-08-14 | End: 2023-08-14

## 2023-08-14 RX ORDER — ONDANSETRON 4 MG/1
4 TABLET, ORALLY DISINTEGRATING ORAL EVERY 6 HOURS PRN
Status: DISCONTINUED | OUTPATIENT
Start: 2023-08-14 | End: 2023-08-15 | Stop reason: HOSPADM

## 2023-08-14 RX ADMIN — ACETAMINOPHEN 975 MG: 325 TABLET, FILM COATED ORAL at 10:35

## 2023-08-14 RX ADMIN — ASPIRIN 81 MG: 81 TABLET, COATED ORAL at 20:28

## 2023-08-14 RX ADMIN — LIDOCAINE HYDROCHLORIDE 100 MG: 20 INJECTION, SOLUTION INFILTRATION; PERINEURAL at 11:42

## 2023-08-14 RX ADMIN — PHENYLEPHRINE HYDROCHLORIDE 100 MCG: 10 INJECTION INTRAVENOUS at 12:18

## 2023-08-14 RX ADMIN — ACETAMINOPHEN 975 MG: 325 TABLET, FILM COATED ORAL at 22:47

## 2023-08-14 RX ADMIN — CEFAZOLIN 1 G: 1 INJECTION, POWDER, FOR SOLUTION INTRAMUSCULAR; INTRAVENOUS at 20:28

## 2023-08-14 RX ADMIN — SODIUM CHLORIDE, POTASSIUM CHLORIDE, SODIUM LACTATE AND CALCIUM CHLORIDE: 600; 310; 30; 20 INJECTION, SOLUTION INTRAVENOUS at 10:32

## 2023-08-14 RX ADMIN — PHENYLEPHRINE HYDROCHLORIDE 100 MCG: 10 INJECTION INTRAVENOUS at 12:24

## 2023-08-14 RX ADMIN — PHENYLEPHRINE HYDROCHLORIDE 100 MCG: 10 INJECTION INTRAVENOUS at 12:27

## 2023-08-14 RX ADMIN — PHENYLEPHRINE HYDROCHLORIDE 100 MCG: 10 INJECTION INTRAVENOUS at 12:06

## 2023-08-14 RX ADMIN — SENNOSIDES AND DOCUSATE SODIUM 1 TABLET: 50; 8.6 TABLET ORAL at 20:28

## 2023-08-14 RX ADMIN — Medication 2 G: at 11:30

## 2023-08-14 RX ADMIN — PROPOFOL 50 MCG/KG/MIN: 10 INJECTION, EMULSION INTRAVENOUS at 11:42

## 2023-08-14 RX ADMIN — FENTANYL CITRATE 50 MCG: 50 INJECTION, SOLUTION INTRAMUSCULAR; INTRAVENOUS at 11:52

## 2023-08-14 RX ADMIN — PROPOFOL 200 MG: 10 INJECTION, EMULSION INTRAVENOUS at 11:42

## 2023-08-14 RX ADMIN — PHENYLEPHRINE HYDROCHLORIDE 100 MCG: 10 INJECTION INTRAVENOUS at 12:12

## 2023-08-14 RX ADMIN — MIDAZOLAM 2 MG: 1 INJECTION INTRAMUSCULAR; INTRAVENOUS at 10:22

## 2023-08-14 RX ADMIN — ONDANSETRON 4 MG: 2 INJECTION INTRAMUSCULAR; INTRAVENOUS at 11:45

## 2023-08-14 RX ADMIN — SODIUM CHLORIDE, POTASSIUM CHLORIDE, SODIUM LACTATE AND CALCIUM CHLORIDE: 600; 310; 30; 20 INJECTION, SOLUTION INTRAVENOUS at 14:28

## 2023-08-14 ASSESSMENT — ACTIVITIES OF DAILY LIVING (ADL)
ADLS_ACUITY_SCORE: 38
ADLS_ACUITY_SCORE: 35
ADLS_ACUITY_SCORE: 38
ADLS_ACUITY_SCORE: 35
ADLS_ACUITY_SCORE: 38
ADLS_ACUITY_SCORE: 35
ADLS_ACUITY_SCORE: 38
ADLS_ACUITY_SCORE: 35

## 2023-08-14 ASSESSMENT — LIFESTYLE VARIABLES: TOBACCO_USE: 1

## 2023-08-14 NOTE — OR NURSING
BS of 179 which is up from 30 minutes ago of 169.  MD Azul at bedside and talked to patient.  Patient gave herself 1 unit of Lispro Insulin SC as instructed by MD.

## 2023-08-14 NOTE — ANESTHESIA PROCEDURE NOTES
Popliteal and Sciatic Procedure Note    Pre-Procedure   Staff -        Anesthesiologist:  Emanuel Azul MD       Performed By: anesthesiologist       Location: pre-op       Pre-Anesthestic Checklist: patient identified, IV checked, site marked, risks and benefits discussed, informed consent, monitors and equipment checked, pre-op evaluation, at physician/surgeon's request and post-op pain management  Timeout:       Correct Patient: Yes        Correct Procedure: Yes        Correct Site: Yes        Correct Position: Yes        Correct Laterality: Yes        Site Marked: Yes  Procedure Documentation  Procedure: Popliteal, Sciatic       Patient Position: supine       Skin prep: Chloraprep       Local skin infiltrated with 1 mL of 1% lidocaine.        Needle Type: insulated       Needle Gauge: 21.        Needle Length (millimeters): 100        Ultrasound guided       1. Ultrasound was used to identify targeted nerve, plexus, vascular marker, or fascial plane and place a needle adjacent to it in real-time.       2. Ultrasound was used to visualize the spread of anesthetic in close proximity to the above referenced structure.       3. A permanent image is entered into the patient's record.       4. The visualized anatomic structures appeared normal.       5. There were no apparent abnormal pathologic findings.    Assessment/Narrative         The placement was negative for: blood aspirated, painful injection and site bleeding       Paresthesias: No.       Bolus given via needle..        Secured via.        Insertion/Infusion Method: Single Shot       Complications: none     Comments:  Bolus via needle, 20 mL of 0.5% ropivacaine with 1:400,000 epinephrine.    Under ultrasound guidance, a 21 gauge needle was inserted and placed in close proximity to the sciatic nerve. Ultrasound was also used to visualize the spread of anesthetic in close proximity to the nerve being blocked. The nerve appeared anatomically normal,  "and there were no apparent abnormal pathological findings. Patient tolerated well, was mildly sedated but communicative throughout the procedure. A permanent ultrasound image was saved in the patient's record.    The surgeon has given a verbal order transferring care of this patient to me for the performance of regional analgesia block for post op pain control. It is requested of me because I am uniquely trained and qualified to perform this block and the surgeon is neither trained nor qualified to perform this procedure.         FOR [facility] ([facility]) ONLY:   Pain Team Contact information: please page the Pain Team Via [employer]. Search \"Pain\". During daytime hours, please page the attending first. At night please page the resident first.      "

## 2023-08-14 NOTE — ANESTHESIA PROCEDURE NOTES
Airway       Patient location during procedure: OR  Staff -        CRNA: Livier Thorne APRN CRNA       Performed By: CRNA  Consent for Airway        Urgency: elective  Indications and Patient Condition       Indications for airway management: kvng-procedural       Induction type:intravenous       Mask difficulty assessment: 0 - not attempted    Final Airway Details       Final airway type: supraglottic airway    Supraglottic Airway Details        Type: LMA       Brand: I-Gel       LMA size: 4    Post intubation assessment        Placement verified by: capnometry, equal breath sounds and chest rise        Number of attempts at approach: 1       Number of other approaches attempted: 0       Secured with: plastic tape       Ease of procedure: easy       Dentition: Intact and Unchanged

## 2023-08-14 NOTE — ANESTHESIA PREPROCEDURE EVALUATION
Anesthesia Pre-Procedure Evaluation    Patient: Aby Medina   MRN: [id number] : 1960        Procedure : Procedure(s):  Right total ankle arthroplasty          Past Medical History:   Diagnosis Date    Arthritis     right ankle    Diabetes mellitus (H)     Hypertension     Impaired cognition     Malignant neoplasm (H)     breast cancer, left    Memory deficit     Post concussion syndrome     Slowness and poor responsiveness     Thyroid disease       Past Surgical History:   Procedure Laterality Date    APPENDECTOMY      BREAST SURGERY      left lumpectomy, radiation     ORTHOPEDIC SURGERY      right ankle x 3      Allergies   Allergen Reactions    Levothyroxine      Throat Swelling/Closing    Ezetimibe Other (See Comments)     Other reaction(s): Unknown Reaction    Gabapentin Other (See Comments)     HIGH glucose level- Pt is diabetic    Insulin Aspart Other (See Comments)     Does not control blood sugars     Insulin Degludec       Arthralgia  Muscle aches    Insulin Glargine Other (See Comments)     Does not control blood sugars    Levaquin Nausea     Patient states muscle aches, increased BS a lot,     Levofloxacin Nausea    Lovastatin Other (See Comments)     abd pain    Pregabalin Other (See Comments)     Visual changes and depression    Rosuvastatin Other (See Comments)     Abdominal pain    Hydrocodone-Acetaminophen Nausea and Vomiting and Rash    Nickel Rash      Social History     Tobacco Use    Smoking status: Every Day     Packs/day: 1.00     Types: Cigarettes    Smokeless tobacco: Never   Substance Use Topics    Alcohol use: Yes     Comment: occ      Wt Readings from Last 1 Encounters:   23 60.6 kg (133 lb 9.6 oz)        Anesthesia Evaluation   Pt has had prior anesthetic. Type: General.    No history of anesthetic complications       ROS/MED HX  ENT/Pulmonary:     (+) tobacco use, Current use,     Neurologic:     (+) dementia,     Cardiovascular:     (+) Dyslipidemia  hypertension-----    METS/Exercise Tolerance:     Hematologic:       Musculoskeletal:       GI/Hepatic:       Renal/Genitourinary:       Endo:     (+) type I DM, Using insulin, thyroid problem,     Psychiatric/Substance Use:       Infectious Disease:       Malignancy:       Other:      (+) , H/O Chronic Pain,        Physical Exam    Airway        Mallampati: II   TM distance: > 3 FB   Neck ROM: full   Mouth opening: > 3 cm    Respiratory Devices and Support         Dental       (+) Modest Abnormalities - crowns, retainers, 1 or 2 missing teeth      Cardiovascular          Rhythm and rate: regular and normal     Pulmonary           breath sounds clear to auscultation           OUTSIDE LABS:  CBC:   Lab Results   Component Value Date    WBC 6.4 03/10/2014    WBC 8.6 03/21/2012    HGB 13.7 03/10/2014    HGB 13.0 03/21/2012    HCT 40.3 03/10/2014    HCT 37.9 03/21/2012     03/10/2014     03/21/2012     BMP: No results found for: NA, POTASSIUM, CHLORIDE, CO2, BUN, CR, GLC  COAGS: No results found for: PTT, INR, FIBR  POC: No results found for: BGM, HCG, HCGS  HEPATIC:   Lab Results   Component Value Date    ALBUMIN 4.3 03/10/2014    PROTTOTAL 7.2 03/10/2014    ALT 24 03/10/2014    AST 31 03/10/2014    ALKPHOS 71 03/10/2014    BILITOTAL 0.5 03/10/2014     OTHER: No results found for: PH, LACT, A1C, MUSHTAQ, PHOS, MAG, LIPASE, AMYLASE, TSH, T4, T3, CRP, SED    Anesthesia Plan    ASA Status:  3   NPO Status:  NPO Appropriate                  Consents    Anesthesia Plan(s) and associated risks, benefits, and realistic alternatives discussed. Questions answered and patient/representative(s) expressed understanding.    - Discussed:     - Discussed with:  Patient         Postoperative Care    Pain management: IV analgesics, Oral pain medications, Multi-modal analgesia, Peripheral nerve block (Single Shot).   PONV prophylaxis: Ondansetron (or other 5HT-3), Dexamethasone or Solumedrol     Comments:                 Emanuel Azul MD

## 2023-08-14 NOTE — ANESTHESIA POSTPROCEDURE EVALUATION
Patient: Aby Medina    Procedure: Procedure(s):  Right total ankle arthroplasty       Anesthesia Type:  No value filed.    Note:     Postop Pain Control: Uneventful            Sign Out: Well controlled pain   PONV: No   Neuro/Psych: Uneventful            Sign Out: Acceptable/Baseline neuro status   Airway/Respiratory: Uneventful            Sign Out: Acceptable/Baseline resp. status   CV/Hemodynamics: Uneventful            Sign Out: Acceptable CV status   Other NRE: NONE   DID A NON-ROUTINE EVENT OCCUR?            Last vitals:  Vitals Value Taken Time   /58 08/14/23 1330   Temp 36.2  C (97.2  F) 08/14/23 1245   Pulse 67 08/14/23 1344   Resp 12 08/14/23 1344   SpO2 96 % 08/14/23 1344   Vitals shown include unvalidated device data.    Electronically Signed By: Emanuel Azul MD  August 14, 2023  2:54 PM

## 2023-08-14 NOTE — OP NOTE
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    Surgical procedure:  Right total ankle replacement using a Chikis Talaris size 1 tibia, size 1 talus and a 8 mm polyethylene spacer,    Anesthetic:  General and popliteal nerve block.  Surgeon:  Steph Grewal MD    Preamble:  She presented with progressive degenerative changes of her right ankle.  She has a distal tibia and fibula fracture many years ago.  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  She did well with conservative management but is likely increasingly symptomatic.   Informed consent was obtained for above-mentioned procedure.    Surgical procedure:  After adequate induction of a general anesthetic the patient was positioned supine on the operating table.  The right leg was sterilely prepped and free draped in usual fashion.  Tourniquet around the thigh was inflated to 300 mmHg.    A standard 15 cm midline incision was used anterior over the ankle.  The interval between the extensor hallucis longus and tibialis anterior was used to expose the ankle joint.  The osteophytes were removed.    The external guide was then used to do the distal tibial cut 9 mm from the articular surface.  The bone was removed.  The central talar pin was placed and the chamfer cuts down for the talus.  With all the cuts made the tibia measured to a size 1 standard component in the talus to a size 2.  There was excellent stability and range of motion with a 8 mm polyethylene spacer.    The trial components were removed after final keel cuts.  The ankle was thoroughly rinsed.  The definitive components were then impacted in place again with excellent stability and range of motion.    The tourniquet was deflated.  Hemostasis obtained.  The wounds closed in layers.  A sterile dressing and a light  compressive bandage applied followed by a short leg cast. She tolerated the procedure well and was taken to the recovery room in satisfactory condition.    She can Ambulate partial weightbearing with crutches.  Sutures will be removed in 2 weeks.

## 2023-08-14 NOTE — PLAN OF CARE
Goal Outcome Evaluation:      Plan of Care Reviewed With: patient, spouse    Overall Patient Progress: improvingOverall Patient Progress: improving    Outcome Evaluation: Arrived on Unit @1400.  No movement/sensation RLE d/t block.    Patient vital signs are at baseline: Yes  Patient able to ambulate as they were prior to admission or with assist devices provided by therapies during their stay:  No,  Reason:  Not OOB since arrival on Unit.  Patient MUST void prior to discharge:  Yes   DTV  Patient able to tolerate oral intake:  Yes  Pain has adequate pain control using Oral analgesics:  Yes  Does patient have an identified :  Yes   Spouse  Has goal D/C date and time been discussed with patient:  Yes    A&Ox4.   CMS Intact except RLE numbness/movement absent.   VSS on RA.   Not OOB since arrival on Unit..   Due to Void.   Pain controlled with block.   Continue to monitor.

## 2023-08-14 NOTE — ANESTHESIA PROCEDURE NOTES
Adductor canal (targeting saphenous nerve) Procedure Note    Pre-Procedure   Staff -        Anesthesiologist:  Emanuel Azul MD       Performed By: anesthesiologist       Location: pre-op       Pre-Anesthestic Checklist: patient identified, IV checked, site marked, risks and benefits discussed, informed consent, monitors and equipment checked, pre-op evaluation, at physician/surgeon's request and post-op pain management  Timeout:       Correct Patient: Yes        Correct Procedure: Yes        Correct Site: Yes        Correct Position: Yes        Correct Laterality: Yes        Site Marked: Yes  Procedure Documentation  Procedure: Adductor canal (targeting saphenous nerve)       Patient Position: supine       Skin prep: Chloraprep       Local skin infiltrated with 1 mL of 1% lidocaine.        Needle Type: insulated       Needle Gauge: 21.        Needle Length (millimeters): 90        Ultrasound guided       1. Ultrasound was used to identify targeted nerve, plexus, vascular marker, or fascial plane and place a needle adjacent to it in real-time.       2. Ultrasound was used to visualize the spread of anesthetic in close proximity to the above referenced structure.       3. A permanent image is entered into the patient's record.       4. The visualized anatomic structures appeared normal.       5. There were no apparent abnormal pathologic findings.    Assessment/Narrative         The placement was negative for: blood aspirated, painful injection and site bleeding       Paresthesias: No.       Bolus given via needle..        Secured via.        Insertion/Infusion Method: Single Shot       Complications: none     Comments:  Bolus via needle, 10 mL of 0.5% ropivacaine with 1:400,000 epinephrine.    Under ultrasound guidance, a 21 gauge needle was inserted and placed in close proximity to the saphenous nerve. Ultrasound was also used to visualize the spread of anesthetic in close proximity to the nerve being  "blocked. The nerve appeared anatomically normal, and there were no apparent abnormal pathological findings. Patient tolerated well, was mildly sedated but communicative throughout the procedure. A permanent ultrasound image was saved in the patient's record.    The surgeon has given a verbal order transferring care of this patient to me for the performance of regional analgesia block for post op pain control. It is requested of me because I am uniquely trained and qualified to perform this block and the surgeon is neither trained nor qualified to perform this procedure.         FOR [facility] [place] [place]:   Pain Team Contact information: please page the Pain Team Via [employer]. Search \"Pain\". During daytime hours, please page the attending first. At night please page the resident first.      "

## 2023-08-14 NOTE — ANESTHESIA CARE TRANSFER NOTE
Patient: Aby Medina    Procedure: Procedure(s):  Right total ankle arthroplasty       Diagnosis: Degenerative joint disease of ankle [M19.079]  Diagnosis Additional Information: No value filed.    Anesthesia Type:   No value filed.     Note:    Oropharynx: oropharynx clear of all foreign objects  Level of Consciousness: awake  Oxygen Supplementation: face mask  Level of Supplemental Oxygen (L/min / FiO2): 6  Independent Airway: airway patency satisfactory and stable  Dentition: dentition unchanged  Vital Signs Stable: post-procedure vital signs reviewed and stable    Patient transferred to: PACU  Comments: Patient comfortable    Glucose 138 per home monitor  Handoff Report: Identifed the Patient, Identified the Reponsible Provider, Reviewed the pertinent medical history, Discussed the surgical course, Reviewed Intra-OP anesthesia mangement and issues during anesthesia, Set expectations for post-procedure period and Allowed opportunity for questions and acknowledgement of understanding      Vitals:  Vitals Value Taken Time   /55 08/14/23 1235   Temp     Pulse 75 08/14/23 1237   Resp 18 08/14/23 1237   SpO2 99 % 08/14/23 1237   Vitals shown include unvalidated device data.    Electronically Signed By: ESAU Nunez CRNA  August 14, 2023  12:39 PM

## 2023-08-15 ENCOUNTER — APPOINTMENT (OUTPATIENT)
Dept: PHYSICAL THERAPY | Facility: CLINIC | Age: 63
DRG: 469 | End: 2023-08-15
Attending: ORTHOPAEDIC SURGERY
Payer: OTHER MISCELLANEOUS

## 2023-08-15 VITALS
WEIGHT: 133.6 LBS | HEART RATE: 76 BPM | OXYGEN SATURATION: 100 % | RESPIRATION RATE: 16 BRPM | TEMPERATURE: 98.1 F | BODY MASS INDEX: 20.97 KG/M2 | DIASTOLIC BLOOD PRESSURE: 61 MMHG | HEIGHT: 67 IN | SYSTOLIC BLOOD PRESSURE: 140 MMHG

## 2023-08-15 PROBLEM — Z96.661 H/O TOTAL ANKLE REPLACEMENT, RIGHT: Status: ACTIVE | Noted: 2023-08-15

## 2023-08-15 LAB
ANION GAP SERPL CALCULATED.3IONS-SCNC: 10 MMOL/L (ref 7–15)
BUN SERPL-MCNC: 13.6 MG/DL (ref 8–23)
CALCIUM SERPL-MCNC: 8.9 MG/DL (ref 8.8–10.2)
CHLORIDE SERPL-SCNC: 103 MMOL/L (ref 98–107)
CREAT SERPL-MCNC: 0.56 MG/DL (ref 0.51–0.95)
DEPRECATED HCO3 PLAS-SCNC: 26 MMOL/L (ref 22–29)
GFR SERPL CREATININE-BSD FRML MDRD: >90 ML/MIN/1.73M2
GLUCOSE SERPL-MCNC: 115 MG/DL (ref 70–99)
HGB BLD-MCNC: 12.4 G/DL (ref 11.7–15.7)
HGB BLD-MCNC: 12.6 G/DL (ref 11.7–15.7)
POTASSIUM SERPL-SCNC: 3.9 MMOL/L (ref 3.4–5.3)
SODIUM SERPL-SCNC: 139 MMOL/L (ref 136–145)

## 2023-08-15 PROCEDURE — 97530 THERAPEUTIC ACTIVITIES: CPT | Mod: GP

## 2023-08-15 PROCEDURE — 36415 COLL VENOUS BLD VENIPUNCTURE: CPT | Performed by: PHYSICIAN ASSISTANT

## 2023-08-15 PROCEDURE — 80048 BASIC METABOLIC PNL TOTAL CA: CPT | Performed by: PHYSICIAN ASSISTANT

## 2023-08-15 PROCEDURE — 97116 GAIT TRAINING THERAPY: CPT | Mod: GP

## 2023-08-15 PROCEDURE — 99204 OFFICE O/P NEW MOD 45 MIN: CPT | Performed by: PHYSICIAN ASSISTANT

## 2023-08-15 PROCEDURE — 85018 HEMOGLOBIN: CPT | Performed by: PHYSICIAN ASSISTANT

## 2023-08-15 PROCEDURE — 250N000013 HC RX MED GY IP 250 OP 250 PS 637: Performed by: PHYSICIAN ASSISTANT

## 2023-08-15 PROCEDURE — 120N000001 HC R&B MED SURG/OB

## 2023-08-15 PROCEDURE — 250N000011 HC RX IP 250 OP 636: Performed by: PHYSICIAN ASSISTANT

## 2023-08-15 PROCEDURE — 97161 PT EVAL LOW COMPLEX 20 MIN: CPT | Mod: GP

## 2023-08-15 RX ORDER — ONDANSETRON 4 MG/1
4 TABLET, ORALLY DISINTEGRATING ORAL EVERY 6 HOURS PRN
Qty: 10 TABLET | Refills: 0 | Status: SHIPPED | OUTPATIENT
Start: 2023-08-15

## 2023-08-15 RX ORDER — OXYCODONE HYDROCHLORIDE 5 MG/1
5-10 TABLET ORAL EVERY 4 HOURS PRN
Qty: 33 TABLET | Refills: 0 | Status: SHIPPED | OUTPATIENT
Start: 2023-08-15

## 2023-08-15 RX ORDER — AMOXICILLIN 250 MG
1-2 CAPSULE ORAL 2 TIMES DAILY
Qty: 30 TABLET | Refills: 0 | Status: SHIPPED | OUTPATIENT
Start: 2023-08-15

## 2023-08-15 RX ORDER — MULTIVITAMIN WITH IRON
2400 TABLET ORAL DAILY
Start: 2023-08-22

## 2023-08-15 RX ORDER — SIMVASTATIN 10 MG
10 TABLET ORAL EVERY EVENING
Status: DISCONTINUED | OUTPATIENT
Start: 2023-08-15 | End: 2023-08-15 | Stop reason: HOSPADM

## 2023-08-15 RX ORDER — ACETAMINOPHEN 325 MG/1
650 TABLET ORAL EVERY 4 HOURS PRN
Qty: 100 TABLET | Refills: 0 | Status: SHIPPED | OUTPATIENT
Start: 2023-08-15

## 2023-08-15 RX ORDER — ASPIRIN 81 MG/1
81 TABLET ORAL 2 TIMES DAILY
Qty: 60 TABLET | Refills: 0 | Status: SHIPPED | OUTPATIENT
Start: 2023-08-15

## 2023-08-15 RX ADMIN — ASPIRIN 81 MG: 81 TABLET, COATED ORAL at 08:11

## 2023-08-15 RX ADMIN — OXYCODONE HYDROCHLORIDE 5 MG: 5 TABLET ORAL at 10:20

## 2023-08-15 RX ADMIN — CEFAZOLIN 1 G: 1 INJECTION, POWDER, FOR SOLUTION INTRAMUSCULAR; INTRAVENOUS at 02:45

## 2023-08-15 RX ADMIN — ACETAMINOPHEN 975 MG: 325 TABLET, FILM COATED ORAL at 05:19

## 2023-08-15 RX ADMIN — SENNOSIDES AND DOCUSATE SODIUM 1 TABLET: 50; 8.6 TABLET ORAL at 08:11

## 2023-08-15 RX ADMIN — POLYETHYLENE GLYCOL 3350 17 G: 17 POWDER, FOR SOLUTION ORAL at 08:11

## 2023-08-15 ASSESSMENT — ACTIVITIES OF DAILY LIVING (ADL)
ADLS_ACUITY_SCORE: 38

## 2023-08-15 NOTE — PROVIDER NOTIFICATION
MD Notification    Notified Person: MD    Notified Person Name:  Fredrick Tolentino    Notification Date/Time: 8/15 @ 0615    Notification Interaction: vocera    Purpose of Notification: Pt diabetic, manages it herself with phone. She is requesting that we don't do fingersticks to check BG anymore but, just ask. Would you be okay with allowing this? Thanks, Tanya WILKERSON [phone]     Orders Received: Will defer to daytime team.    Comments:

## 2023-08-15 NOTE — PROGRESS NOTES
Orthopedic Surgery  Aby TRAVIS Carol  8/15/2023  Admit Date:  2023  POD 1 Day Post-Op  S/P Procedure(s):  Right total ankle arthroplasty    Patient is feeling good.  Pain controlled.  Tolerating oral intake.  No events overnight. Dressed and eager to discharge this morning.    Alert and orient to person, place, and time.  Vital Sign Ranges  Temperature Temp  Av.7  F (36.5  C)  Min: 97  F (36.1  C)  Max: 98.4  F (36.9  C)   Blood pressure Systolic (24hrs), Av , Min:99 , Max:157        Diastolic (24hrs), Av, Min:49, Max:71      Pulse Pulse  Av.1  Min: 64  Max: 85   Respirations Resp  Av.2  Min: 10  Max: 22   Pulse oximetry SpO2  Av.4 %  Min: 95 %  Max: 100 %       R splint is clean, dry, and intact. Minimal erythema of the surrounding skin.  Left calf is soft, non-tender.  R lower extremity is NVI.  Able to wiggle toes    Labs:  No results for input(s): POTASSIUM in the last 12681 hours.  Recent Labs   Lab Test 08/15/23  0831 08/15/23  0732   HGB 12.6 12.4     No results for input(s): INR in the last 55745 hours.  No results for input(s): PLT in the last 94380 hours.    A/P  1. S/p R TAA   Continue ASA for DVT prophylaxis.     Mobilize with PT/OT 50%WB.     Continue current pain regiment.    2. Disposition   Anticipate d/c to home today.    Jessica Beasley PA-C

## 2023-08-15 NOTE — PROGRESS NOTES
08/15/23 0859   Appointment Info   Signing Clinician's Name / Credentials (PT) ZACK Vanessa   Student Supervision On-site supervision provided;Direct supervision provided;Therapy services provided with the co-signing licensed therapist guiding and directing the services, and providing the skilled judgement and assessment throughout the session  (Marisela Resendez, PT, DPT)   Living Environment   People in Home spouse   Current Living Arrangements house   Home Accessibility no concerns   Transportation Anticipated family or friend will provide   Living Environment Comments Pt lives in home with all needs on single level. Home is reportedly wheelchair accessable.   Self-Care   Usual Activity Tolerance good   Current Activity Tolerance good   Equipment Currently Used at Home walker, rolling;tub bench;crutches   Fall history within last six months no   Activity/Exercise/Self-Care Comment Pt ind at baseline with all ADLs and IADLs but is elective to have  assist with these for efficiency. Pt ocassionally uses walker and crutches at home when ankle is in pain. Pt has access to walker, crutches, cane, knee scooter, shower bench, has a walk in shower.   General Information   Onset of Illness/Injury or Date of Surgery 08/14/23   Referring Physician Steph Grewal MD   Patient/Family Therapy Goals Statement (PT) Return to home   Pertinent History of Current Problem (include personal factors and/or comorbidities that impact the POC) Pt is a 62 y.o. female s/p R TAA on 08/15/23 pt is POD#1   Existing Precautions/Restrictions fall;weight bearing   Weight-Bearing Status - RLE partial weight-bearing (% in comments)  (50%)   General Observations Post op shoe on R foot   Cognition   Affect/Mental Status (Cognition) WFL   Orientation Status (Cognition) oriented x 4   Follows Commands (Cognition) WFL   Pain Assessment   Patient Currently in Pain   (5/10)   Integumentary/Edema   Integumentary/Edema Comments Dressing on  pt R LE appears CDI   Posture    Posture Forward head position;Protracted shoulders   Range of Motion (ROM)   ROM Comment WFL for BUE and LLE with functional mobility. Pt R ankle ROM limited by pain and post op status.   Strength (Manual Muscle Testing)   Strength (Manual Muscle Testing) Able to perform R SLR;Able to perform L SLR;Deficits observed during functional mobility   Strength Comments Grossly antigravity for BUE and BLE with functional mobility.   Bed Mobility   Comment, (Bed Mobility) supine HOB elevated>sitting EOB, ind.   Transfers   Comment, (Transfers) sit>stand to FWW, SBA   Gait/Stairs (Locomotion)   Distance in Feet 5' eval, 150' treatment   Distance in Feet (Gait) 125+25'   Pattern (Gait) step-to   Deviations/Abnormal Patterns (Gait) antalgic;caitlin decreased;gait speed decreased;stride length decreased;weight shifting decreased   Maintains Weight-bearing Status (Gait) able to maintain   Comment, (Gait/Stairs) Pt amb with FWW, CGA   Balance   Balance Comments Pt able to maintain seated balance at EOB without hands for assist, no LOB. Pt able to maintain standing balance with FWW, no LOB.   Sensory Examination   Sensory Perception Comments Pt reports numbness and tingling at and below the R knee.   Clinical Impression   Criteria for Skilled Therapeutic Intervention Yes, treatment indicated   PT Diagnosis (PT) Impaired gait   Influenced by the following impairments Pain, decreased ROM, decreased strength, post op status.   Functional limitations due to impairments Pain, impaired ADLs, impaired IADLs, impaired functional independence.   Clinical Presentation (PT Evaluation Complexity) Stable/Uncomplicated   Clinical Presentation Rationale Per MR and clinical rationale   Clinical Decision Making (Complexity) low complexity   Planned Therapy Interventions (PT) balance training;bed mobility training;cryotherapy;gait training;home exercise program;patient/family education;ROM (range of motion);stair  training;strengthening;stretching;transfer training;progressive activity/exercise   Risk & Benefits of therapy have been explained evaluation/treatment results reviewed;care plan/treatment goals reviewed;risks/benefits reviewed;current/potential barriers reviewed;participants voiced agreement with care plan;participants included;patient;spouse/significant other   PT Total Evaluation Time   PT Eval, Low Complexity Minutes (92652) 9   Plan of Care Review   Plan of Care Reviewed With patient;spouse   Physical Therapy Goals   PT Frequency One time eval and treatment only   PT Predicted Duration/Target Date for Goal Attainment 08/15/23   PT Goals Bed Mobility;Transfers;Gait   PT: Bed Mobility Supervision/stand-by assist;Supine to/from sit;Within precautions;Goal Met   PT: Transfers Supervision/stand-by assist;Sit to/from stand;Assistive device;Within precautions;Goal Met   PT: Gait Supervision/stand-by assist;Assistive device;Rolling walker;150 feet;Within precautions;Goal Met   Interventions   Interventions Quick Adds Gait Training;Therapeutic Activity   Therapeutic Activity   Therapeutic Activities: dynamic activities to improve functional performance Minutes (84914) 10   Symptoms Noted During/After Treatment Fatigue;Increased pain   Treatment Detail/Skilled Intervention Pt agreed to therapy session. Pt supine HOB elevated at start of session. Increased time sitting at EOB for core activation and to don post op shoe, ind. Edu pt on WB precautions. Demo for pt proper sit>stand technique. Pt performed 3x additional sit<>stand throughout session, progressing to SBA. Pt left seated in recliner chair at end of session.  Edu pt on icing for pain relief and reduced for inflammation and pain reduction. Pt able to adjust self in chair. RLE elevated, chair alarm on, all needs in reach, RN aware.   Gait Training   Gait Training Minutes (96792) 14   Symptoms Noted During/After Treatment (Gait Training) fatigue;increased pain    Treatment Detail/Skilled Intervention Demo for pt step-to gait technique. Pt amb in room and hallway, progressing to SBA. two bouts of amb with seated rest break between. Cues for step to pattern, cues for upright gaze and posture, cues for breathing thorughout. Demo for pt proper stair negotiation technique. Pt performed 2 sets of single platform step, L leg leading ascending, R leg leading descending. Cues for sequencing. Demo for pt proper use of knee scooter. Pt wheeled on knee scooter 125', SBA and appears safe with its use. Pt tolerated session well.   [place] Level (Gait Training) stand-by assist   Physical Assistance Level (Gait Training) set-up required;supervision;verbal cues;nonverbal cues (demo/gestures);1 person assist   Weight Bearing (Gait Training) partial weight-bearing   Assistive Device (Gait Training) rolling walker   Gait Analysis Deviations decreased caitlin;decreased step length;decreased stride length;decreased weight-shifting ability;increased time in double stance   Impairments (Gait Analysis/Training) pain;ROM decreased;strength decreased   Physical Assist/Nonphysical Assist (Stairs) set-up required;supervision;verbal cues;nonverbal cues (demo/gestures);1 person assist   Level of [place] (Stairs) stand-by assist   Assistive Device (Stairs) rolling walker   PT Discharge Planning   PT Plan DC to home with assist   PT Rationale for DC Rec Pt tolerated session well. Pt currently functioning below baseline level functional independence. Anticipate pt to require assist as needed from , use of FWW or knee scooter for mobility, supervision on stairs at time of DC.   PT Brief overview of current status All mobility, SBA   Total Session Time   Timed Code Treatment Minutes 24   Total Session Time (sum of timed and untimed services) 33

## 2023-08-15 NOTE — PLAN OF CARE
Orientation: AO x4    Vitals/Tele: VSS RA    IV Access/drains: L PIV SL     Diet: Regular    Mobility: A1-2 w/ gb/walker, Pt pivoted to bedside commode this shift. PWB 50% to RLE    GI/: continent of B/B, calls appropriately    Wound/Skin: stab incision distal R knee, R ankle incision    Consults: hospitalist    Discharge Plan: discharge home    Other: Pt checks BG w/ phone and manages herself    See Flow sheets for assessment

## 2023-08-15 NOTE — PLAN OF CARE
Goal Outcome Evaluation:      Plan of Care Reviewed With: patient, spouse    Overall Patient Progress: improvingOverall Patient Progress: improving    Outcome Evaluation: Discharge to Home 08/15/2023 @11:15 w/ Spouse.    Patient vital signs are at baseline: Yes  Patient able to ambulate as they were prior to admission or with assist devices provided by therapies during their stay:  Yes  Patient MUST void prior to discharge:  Yes  Patient able to tolerate oral intake:  Yes  Pain has adequate pain control using Oral analgesics:  Yes  Does patient have an identified :  Yes   Spouse  Has goal D/C date and time been discussed with patient:  Yes   Discharge to Home 08/15/2023 @11:15 w/ Spouse.    A&Ox4.   CMS Intact.   VSS on RA.   Up with SBA GB and walker.   Voiding in BR.   Pain controlled with Oxycodone, Tylenol.   Continue to monitor.    Reviewed discharge instructions and medications with patient and spouse:YES  Questions answered:YES  Patient discharged to:Home w/ Spouse  All belongs discharged with patient:YES

## 2023-08-15 NOTE — CONSULTS
[facility]  Consult Note - Hospitalist Service  Date of Admission:  8/14/2023  Consult Requested by: Dr. Grewal   Reason for Consult: Post-op medical co-management     Assessment & Plan   Aby Medina is a 62 year old female with below PMHx admitted on 8/14/2023. She underwent R total ankle replacement with Dr. Grewal. Hospitalist service was consulted for post-op medical management     S/P R total ankle replacement (8/14/23): Surgery per Dr. Grewal. General anesthesia and popliteal nerve block used.   -- Defer routine post-operative cares, IVF, DVT prophylaxis and pain control to primary service   -- Treated with periop Ancef   -- Analgesic regimen with tylenol 975 mg q8 hrs, oxycodone 5-10 mg q4 hrs PRN, IV dilaudid 0.2-0.4 mg q2 hrs PRN  -- VTE prophylaxis with ASA 81 mg BID     -- Encourage pulmonary toilet; incentive spirometer at bedside   -- Bowel regimen in place while on narcotics   -- PT and OT in the AM   -- Hgb and BMP in AM     T1DM  [Lantus 16 U qam, Lispro 1 U per 10 g CHO with correction factor of 30 (up to 33 U in divided doses]  - Resume PTA regimen, pt is managing her own insulin in house. Her diet has been advanced. She resumed the above regimen post-op.     Recent Labs   Lab 08/15/23  0831 08/14/23  2222   * 184*     Hypertension: AM BMP WNL. Pt resumed PTA Losartan-hydrochlorothiazide 100-12.5 mg po every day 8/15 AM.     Hypothyroidism: Continue PTA Synthroid   Hyperlipidemia: Continue PTA Zocor     Dispo: OK to discharge from medicine standpoint.      The patient's care was discussed with the Attending Physician, Dr. SANTOSH Diego, Bedside Nurse, Patient, and Patient's Family.    Clinically Significant Risk Factors Present on Admission                    # Hypertension: Home medication list includes antihypertensive(s)                 Ceci Martinez PA-C  Hospitalist Service  Securely message with [employer] (more info)  Text page via [employer]  Michelle/y   _______________________________________________________________    Chief Complaint   S/P total ankle replacement    History is obtained from the patient and chart review.     History of Present Illness   Aby Medina is a 62 year old female with below PMHx admitted on 8/14/2023. She underwent R total ankle replacement with Dr. Grewal. Hospitalist service was consulted for post-op medical management     Surgery per Dr. Grewal. General anesthesia and popliteal nerve block used. Seen post-op. Pain well managed. No nausea. Pt self administered all of her home medications including PTA insulin this AM. She has a dexcom in place. Regular diet. Voiding well. Passing flatus. No acute concerns.     Past Medical History    Past Medical History:   Diagnosis Date    Arthritis     right ankle    Diabetes mellitus (H)     Hypertension     Impaired cognition     Malignant neoplasm (H)     breast cancer, left    Memory deficit     Post concussion syndrome     Slowness and poor responsiveness     Thyroid disease        Past Surgical History   Past Surgical History:   Procedure Laterality Date    APPENDECTOMY      BREAST SURGERY      left lumpectomy, radiation 1/12    ORTHOPEDIC SURGERY      right ankle x 3       Medications   Medications Prior to Admission   Medication Sig Dispense Refill Last Dose    Cholecalciferol (VITAMIN D3) 250 MCG (56961 UT) TABS Take 1 capsule by mouth every evening   Past Week at PM    insulin glargine (LANTUS SOLOSTAR) 100 UNIT/ML pen 16 Units every morning   8/14/2023 at AM    Insulin Lispro-aabc, 1 U Dial, (LYUMJEV KWIKPEN) 100 UNIT/ML SOPN 1 unit/10 grams of carbs, with a correction factor of 30:up to 33 units in divided doses    at PM    levothyroxine (SYNTHROID/LEVOTHROID) 125 MCG tablet Take 0.5 tablets by mouth once a week (Sundays:Brand Synthroid)    at AM    levothyroxine (SYNTHROID/LEVOTHROID) 125 MCG tablet Take 125 mcg by mouth six times a week (Monday, Tuesday,  Wednesday, Thursday, Friday & Saturdays:Brand Synthroid) 90 tablet 3 8/14/2023 at AM    losartan-hydrochlorothiazide (HYZAAR) 100-12.5 MG tablet    8/14/2023 at AM    magnesium oxide 400 MG CAPS Take 1 capsule by mouth 2 times daily   Past Week at PM    simvastatin (ZOCOR) 10 MG tablet Take 10 mg by mouth every evening   8/13/2023 at PM    VITAMIN A PO Take 2,400 Units by mouth daily   Past Week at AM    XIIDRA 5 % opthalmic solution Place 1 drop into both eyes 2 times daily   8/14/2023 at AM    ACCU-CHEK GUIDE test strip        BD PEN NEEDLE KELLY 2ND GEN 32G X 4 MM miscellaneous USE ONE PEN NEEDLE 4 TIMES DAILY AS DIRECTED       blood glucose ([employer] BLOOD GLUCOSE TEST) test strip Test 10 times per day.       Blood Glucose Monitoring Suppl (ACCU-CHEK GUIDE) w/Device KIT See Admin Instructions       Continuous Blood Gluc  (DEXCOM G6 ) BEST As directed.       [DISCONTINUED] acetaminophen (TYLENOL) 500 MG tablet Take 500-1,000 mg by mouth every 6 hours as needed for mild pain   Unknown at PRN          Review of Systems    The 10 point Review of Systems is negative other than noted in the HPI.    Social History   I have reviewed this patient's social history and updated it with pertinent information if needed.  Social History     Tobacco Use    Smoking status: Every Day     Packs/day: 1.00     Types: Cigarettes    Smokeless tobacco: Never   Vaping Use    Vaping Use: Never used   Substance Use Topics    Alcohol use: Yes     Comment: occ    Drug use: No     Family History   I have reviewed this patient's family history and updated it with pertinent information if needed.  Family History   Problem Relation Age of Onset    Thyroid Disease Mother     Heart Disease Father      Allergies   Allergies   Allergen Reactions    Levothyroxine      Throat Swelling/Closing    Ezetimibe Other (See Comments)     Other reaction(s): Unknown Reaction    Gabapentin Other (See Comments)     HIGH glucose level- Pt is diabetic     Insulin Aspart Other (See Comments)     Does not control blood sugars     Insulin Degludec       Arthralgia  Muscle aches    Insulin Glargine Other (See Comments)     Does not control blood sugars    Levaquin Nausea     Patient states muscle aches, increased BS a lot,     Levofloxacin Nausea    Lovastatin Other (See Comments)     abd pain    Pregabalin Other (See Comments)     Visual changes and depression    Rosuvastatin Other (See Comments)     Abdominal pain    Hydrocodone-Acetaminophen Nausea and Vomiting and Rash    Nickel Rash      Physical Exam   Vital Signs: Temp: 98.1  F (36.7  C) Temp src: Oral BP: (!) 140/61 Pulse: 76   Resp: 16 SpO2: 100 % O2 Device: None (Room air) Oxygen Delivery: 2 LPM  Weight: 133 lbs 9.6 oz    CONSTITUTIONAL: Pt laying in bed, dressed in hospital garb. Appears comfortable. Cooperative with interview. Accompanied by  at baseline.   HEENT: Normocephalic, atraumatic.   CARDIOVASCULAR: RRR, no murmurs, rubs, or extra heart sounds appreciated. Pulses +2/4 and regular in upper and lower extremities, bilaterally.   RESPIRATORY: No increased work of breathing.  CTA, bilat; no wheezes, rales, or rhonchi appreciated.  GASTROINTESTINAL:  Abdomen soft, non-distended. BS auscultated in all four quadrants. Negative for tenderness to palpation.    MUSCULOSKELETAL: Ankle dressed. No gross deformities noted. Normal muscle tone.   HEMATOLOGIC/LYMPHATIC/IMMUNOLOGIC: Negative for lower extremity edema, bilaterally.  NEUROLOGIC: Alert and oriented to person, place, and time.  strength intact. No focal neuro deficits.   SKIN: Warm, dry, intact.    Medical Decision Making       40 MINUTES SPENT BY ME on the date of service doing chart review, history, exam, documentation & further activities per the note.      Data     I have personally reviewed the following data over the past 24 hrs:    N/A  \   12.6   / N/A     139 103 13.6 /  115 (H)   3.9 26 0.56 \     Imaging results reviewed over the  past 24 hrs:   Recent Results (from the past 24 hour(s))   XR Surgery RENEE L/T 5 Min Fluoro w Stills    Narrative    XR SURGERY RENEE FLUORO LESS THAN 5 MIN W STILLS 8/14/2023 12:29 PM     HISTORY: right total ankle arthroplasty. c-arm #1. [id number]. quynh/nataly.  2 images. OR 21. 3 sec fluoro time.    NUMBER OF IMAGES ACQUIRED: 2    FLUOROSCOPY TIME: 0.1 minute(s)      Impression    IMPRESSION: Fluoroscopy was provided to the patient's physician for  the procedure. Spot images demonstrate tibiotalar arthroplasty in  satisfactory alignment. See procedure note for further details.    JADA HATCH MD         SYSTEM ID:  [id number]

## 2023-08-15 NOTE — PLAN OF CARE
Physical Therapy Discharge Summary    Reason for therapy discharge:    Discharged to home.  All goals and outcomes met, no further needs identified.    Progress towards therapy goal(s). See goals on Care Plan in [facility] electronic health record for goal details.  Goals met    Therapy recommendation(s):    Continue home exercise program.   PT Rationale for DC Rec: Pt tolerated session well. Pt currently functioning below baseline level functional independence. Anticipate pt to require assist as needed from , use of FWW or knee scooter for mobility, supervision on stairs at time of DC.  PT Brief overview of current status: All mobility, SBA

## 2023-08-15 NOTE — PROGRESS NOTES
Hospitalist consult received. Sliding scale insulin and gluc ordered. Consult to be completed in AM.    TONI Gomez, PAChadwickC  Hospitalist QU[place]  [facility]  Securely message with the [employer] Web Console (learn more here)  Text page via [employer] Paging/Directory

## 2023-08-28 NOTE — DISCHARGE SUMMARY
Ms. Angela was admitted on 8/14/23 for right right total ankle replacement.    Her postoperative stay was uneventful.  She did physical therapy and was told to go 50% weightbearing.  Vitals were stable.  Her block was intact at the time of discharge.  She tolerated her medications well.    She was discharged room partial weightbearing with crutches.  Postoperative instructions and medications were provided.  She is scheduled for follow-up with Dr. Grewal in 2 weeks.

## 2023-09-07 NOTE — TELEPHONE ENCOUNTER
FLP annually - order for 6 mos given, con't current statin for now, healthy diet and regular exercise encouraged Hello,  I'm with ortho con.  Pt sees Dr. Nj.  She is asking for Fartun or Felipe to place an order for an XR guided joint inj for a December appt.  Pt can be reached at .  Thank you.

## (undated) DEVICE — BLADE SAW RECIP STRK 70X6X0.025MM 0277-096-250S5

## (undated) DEVICE — SOL NACL 0.9% IRRIG 1000ML BOTTLE 2F7124

## (undated) DEVICE — BLADE KNIFE SURG 15 371115

## (undated) DEVICE — DRSG GAUZE 4X4" 3033

## (undated) DEVICE — SU VICRYL 2-0 CT-2 27" UND J269H

## (undated) DEVICE — ESU PENCIL W/SMOKE EVAC NEPTUNE STRYKER 0703-046-000

## (undated) DEVICE — GLOVE BIOGEL PI MICRO SZ 8.0 48580

## (undated) DEVICE — CAST PADDING 4" UNSTERILE 9044

## (undated) DEVICE — SOL WATER IRRIG 1000ML BOTTLE 2F7114

## (undated) DEVICE — IMM LIMB ELEVATOR DC40-0203

## (undated) DEVICE — DRAPE COVER C-ARM SEAMLESS SNAP-KAP 03-KP26 LATEX FREE

## (undated) DEVICE — SET OF 12 PINS +1 REAMER FOR SALTO ANKLE LJV-529T

## (undated) DEVICE — DRSG ABDOMINAL 07 1/2X8" 7197D

## (undated) DEVICE — SPONGE LAP 18X18" X8435

## (undated) DEVICE — BNDG ELASTIC 4" DBL LENGTH UNSTERILE 6611-14

## (undated) DEVICE — CAST PLASTER SPLINT 5X30" 7395

## (undated) DEVICE — DRILL BIT L135 MM OD3 MM LJV528T

## (undated) DEVICE — DECANTER BAG 2002S

## (undated) DEVICE — PREP CHLORAPREP 26ML TINTED HI-LITE ORANGE 930815

## (undated) DEVICE — PACK EXTREMITY SOP15EXFSD

## (undated) DEVICE — LINEN TOWEL PACK X5 5464

## (undated) DEVICE — DRAIN JACKSON PRATT 07MM FLAT 3/4 PERF

## (undated) DEVICE — BLADE SAW LG BONE TORNIER 70X13X1.27MM SAW6944T

## (undated) DEVICE — DRAPE SHEET REV FOLD 3/4 9349

## (undated) DEVICE — BLADE SW 12.5MM BRASSELER THK.94MM 70MM I SD HUB STRL DISP

## (undated) DEVICE — STPL SKIN 35W ROTATING HEAD PRW35

## (undated) DEVICE — GOWN XXLG REINFORCED 9071EL

## (undated) RX ORDER — BUPIVACAINE HYDROCHLORIDE 2.5 MG/ML
INJECTION, SOLUTION EPIDURAL; INFILTRATION; INTRACAUDAL
Status: DISPENSED
Start: 2018-11-07

## (undated) RX ORDER — ONDANSETRON 2 MG/ML
INJECTION INTRAMUSCULAR; INTRAVENOUS
Status: DISPENSED
Start: 2023-08-14

## (undated) RX ORDER — BUPIVACAINE HYDROCHLORIDE 2.5 MG/ML
INJECTION, SOLUTION EPIDURAL; INFILTRATION; INTRACAUDAL
Status: DISPENSED
Start: 2019-11-08

## (undated) RX ORDER — TRIAMCINOLONE ACETONIDE 40 MG/ML
INJECTION, SUSPENSION INTRA-ARTICULAR; INTRAMUSCULAR
Status: DISPENSED
Start: 2018-02-23

## (undated) RX ORDER — LIDOCAINE HYDROCHLORIDE 10 MG/ML
INJECTION, SOLUTION EPIDURAL; INFILTRATION; INTRACAUDAL; PERINEURAL
Status: DISPENSED
Start: 2018-05-18

## (undated) RX ORDER — LIDOCAINE HYDROCHLORIDE 10 MG/ML
INJECTION, SOLUTION EPIDURAL; INFILTRATION; INTRACAUDAL; PERINEURAL
Status: DISPENSED
Start: 2019-02-07

## (undated) RX ORDER — PROPOFOL 10 MG/ML
INJECTION, EMULSION INTRAVENOUS
Status: DISPENSED
Start: 2023-08-14

## (undated) RX ORDER — LIDOCAINE HYDROCHLORIDE 10 MG/ML
INJECTION, SOLUTION EPIDURAL; INFILTRATION; INTRACAUDAL; PERINEURAL
Status: DISPENSED
Start: 2019-11-08

## (undated) RX ORDER — LIDOCAINE HYDROCHLORIDE 10 MG/ML
INJECTION, SOLUTION EPIDURAL; INFILTRATION; INTRACAUDAL; PERINEURAL
Status: DISPENSED
Start: 2021-01-28

## (undated) RX ORDER — LIDOCAINE HYDROCHLORIDE 10 MG/ML
INJECTION, SOLUTION EPIDURAL; INFILTRATION; INTRACAUDAL; PERINEURAL
Status: DISPENSED
Start: 2021-11-18

## (undated) RX ORDER — BUPIVACAINE HYDROCHLORIDE 2.5 MG/ML
INJECTION, SOLUTION EPIDURAL; INFILTRATION; INTRACAUDAL
Status: DISPENSED
Start: 2021-01-28

## (undated) RX ORDER — LIDOCAINE HYDROCHLORIDE 10 MG/ML
INJECTION, SOLUTION EPIDURAL; INFILTRATION; INTRACAUDAL; PERINEURAL
Status: DISPENSED
Start: 2023-06-30

## (undated) RX ORDER — LIDOCAINE HYDROCHLORIDE 10 MG/ML
INJECTION, SOLUTION EPIDURAL; INFILTRATION; INTRACAUDAL; PERINEURAL
Status: DISPENSED
Start: 2018-02-23

## (undated) RX ORDER — ACETAMINOPHEN 325 MG/1
TABLET ORAL
Status: DISPENSED
Start: 2023-08-14

## (undated) RX ORDER — LIDOCAINE HYDROCHLORIDE 10 MG/ML
INJECTION, SOLUTION EPIDURAL; INFILTRATION; INTRACAUDAL; PERINEURAL
Status: DISPENSED
Start: 2022-02-24

## (undated) RX ORDER — BUPIVACAINE HYDROCHLORIDE 2.5 MG/ML
INJECTION, SOLUTION EPIDURAL; INFILTRATION; INTRACAUDAL
Status: DISPENSED
Start: 2023-03-07

## (undated) RX ORDER — LIDOCAINE HYDROCHLORIDE 10 MG/ML
INJECTION, SOLUTION EPIDURAL; INFILTRATION; INTRACAUDAL; PERINEURAL
Status: DISPENSED
Start: 2022-12-02

## (undated) RX ORDER — LIDOCAINE HYDROCHLORIDE 10 MG/ML
INJECTION, SOLUTION EPIDURAL; INFILTRATION; INTRACAUDAL; PERINEURAL
Status: DISPENSED
Start: 2019-05-07

## (undated) RX ORDER — LIDOCAINE HYDROCHLORIDE 10 MG/ML
INJECTION, SOLUTION EPIDURAL; INFILTRATION; INTRACAUDAL; PERINEURAL
Status: DISPENSED
Start: 2022-09-02

## (undated) RX ORDER — BUPIVACAINE HYDROCHLORIDE 2.5 MG/ML
INJECTION, SOLUTION EPIDURAL; INFILTRATION; INTRACAUDAL
Status: DISPENSED
Start: 2018-02-23

## (undated) RX ORDER — FENTANYL CITRATE 50 UG/ML
INJECTION, SOLUTION INTRAMUSCULAR; INTRAVENOUS
Status: DISPENSED
Start: 2023-08-14

## (undated) RX ORDER — LIDOCAINE HYDROCHLORIDE 10 MG/ML
INJECTION, SOLUTION EPIDURAL; INFILTRATION; INTRACAUDAL; PERINEURAL
Status: DISPENSED
Start: 2018-08-23

## (undated) RX ORDER — LIDOCAINE HYDROCHLORIDE 10 MG/ML
INJECTION, SOLUTION EPIDURAL; INFILTRATION; INTRACAUDAL; PERINEURAL
Status: DISPENSED
Start: 2020-02-14

## (undated) RX ORDER — BUPIVACAINE HYDROCHLORIDE 2.5 MG/ML
INJECTION, SOLUTION EPIDURAL; INFILTRATION; INTRACAUDAL
Status: DISPENSED
Start: 2019-02-07

## (undated) RX ORDER — LIDOCAINE HYDROCHLORIDE 10 MG/ML
INJECTION, SOLUTION EPIDURAL; INFILTRATION; INTRACAUDAL; PERINEURAL
Status: DISPENSED
Start: 2020-07-14

## (undated) RX ORDER — TRIAMCINOLONE ACETONIDE 40 MG/ML
INJECTION, SUSPENSION INTRA-ARTICULAR; INTRAMUSCULAR
Status: DISPENSED
Start: 2023-05-10

## (undated) RX ORDER — BUPIVACAINE HYDROCHLORIDE 2.5 MG/ML
INJECTION, SOLUTION EPIDURAL; INFILTRATION; INTRACAUDAL
Status: DISPENSED
Start: 2022-09-02

## (undated) RX ORDER — LIDOCAINE HYDROCHLORIDE 10 MG/ML
INJECTION, SOLUTION EPIDURAL; INFILTRATION; INTRACAUDAL; PERINEURAL
Status: DISPENSED
Start: 2019-08-09

## (undated) RX ORDER — LIDOCAINE HYDROCHLORIDE 10 MG/ML
INJECTION, SOLUTION EPIDURAL; INFILTRATION; INTRACAUDAL; PERINEURAL
Status: DISPENSED
Start: 2023-03-07

## (undated) RX ORDER — LIDOCAINE HYDROCHLORIDE 10 MG/ML
INJECTION, SOLUTION EPIDURAL; INFILTRATION; INTRACAUDAL; PERINEURAL
Status: DISPENSED
Start: 2018-11-07

## (undated) RX ORDER — LIDOCAINE HYDROCHLORIDE 10 MG/ML
INJECTION, SOLUTION EPIDURAL; INFILTRATION; INTRACAUDAL; PERINEURAL
Status: DISPENSED
Start: 2020-10-27

## (undated) RX ORDER — BUPIVACAINE HYDROCHLORIDE 2.5 MG/ML
INJECTION, SOLUTION EPIDURAL; INFILTRATION; INTRACAUDAL
Status: DISPENSED
Start: 2023-05-10

## (undated) RX ORDER — TRIAMCINOLONE ACETONIDE 40 MG/ML
INJECTION, SUSPENSION INTRA-ARTICULAR; INTRAMUSCULAR
Status: DISPENSED
Start: 2018-11-07

## (undated) RX ORDER — LIDOCAINE HYDROCHLORIDE 10 MG/ML
INJECTION, SOLUTION EPIDURAL; INFILTRATION; INTRACAUDAL; PERINEURAL
Status: DISPENSED
Start: 2021-08-03

## (undated) RX ORDER — LIDOCAINE HYDROCHLORIDE 10 MG/ML
INJECTION, SOLUTION EPIDURAL; INFILTRATION; INTRACAUDAL; PERINEURAL
Status: DISPENSED
Start: 2021-04-27

## (undated) RX ORDER — LIDOCAINE HYDROCHLORIDE 10 MG/ML
INJECTION, SOLUTION EPIDURAL; INFILTRATION; INTRACAUDAL; PERINEURAL
Status: DISPENSED
Start: 2022-05-19

## (undated) RX ORDER — LIDOCAINE HYDROCHLORIDE 10 MG/ML
INJECTION, SOLUTION EPIDURAL; INFILTRATION; INTRACAUDAL; PERINEURAL
Status: DISPENSED
Start: 2023-05-10